# Patient Record
Sex: FEMALE | Race: WHITE | Employment: OTHER | URBAN - METROPOLITAN AREA
[De-identification: names, ages, dates, MRNs, and addresses within clinical notes are randomized per-mention and may not be internally consistent; named-entity substitution may affect disease eponyms.]

---

## 2021-07-25 ENCOUNTER — HOSPITAL ENCOUNTER (EMERGENCY)
Age: 86
Discharge: HOME | End: 2021-07-25
Attending: EMERGENCY MEDICINE
Payer: MEDICARE

## 2021-07-25 ENCOUNTER — APPOINTMENT (OUTPATIENT)
Dept: GENERAL RADIOLOGY | Age: 86
End: 2021-07-25
Payer: MEDICARE

## 2021-07-25 VITALS
RESPIRATION RATE: 21 BRPM | OXYGEN SATURATION: 96 % | DIASTOLIC BLOOD PRESSURE: 64 MMHG | SYSTOLIC BLOOD PRESSURE: 125 MMHG | TEMPERATURE: 98 F | HEART RATE: 62 BPM | HEIGHT: 62 IN

## 2021-07-25 DIAGNOSIS — R55 SYNCOPE, UNSPECIFIED SYNCOPE TYPE: Primary | ICD-10-CM

## 2021-07-25 LAB
A/G RATIO: 1.4 (ref 1.1–2.2)
ALBUMIN SERPL-MCNC: 3.3 G/DL (ref 3.4–5)
ALP BLD-CCNC: 60 U/L (ref 40–129)
ALT SERPL-CCNC: 22 U/L (ref 10–40)
ANION GAP SERPL CALCULATED.3IONS-SCNC: 10 MMOL/L (ref 3–16)
AST SERPL-CCNC: 23 U/L (ref 15–37)
BASOPHILS ABSOLUTE: 0 K/UL (ref 0–0.2)
BASOPHILS RELATIVE PERCENT: 0.2 %
BILIRUB SERPL-MCNC: 0.9 MG/DL (ref 0–1)
BUN BLDV-MCNC: 18 MG/DL (ref 7–20)
CALCIUM SERPL-MCNC: 8.2 MG/DL (ref 8.3–10.6)
CHLORIDE BLD-SCNC: 107 MMOL/L (ref 99–110)
CO2: 24 MMOL/L (ref 21–32)
CREAT SERPL-MCNC: 1 MG/DL (ref 0.6–1.2)
EOSINOPHILS ABSOLUTE: 0.1 K/UL (ref 0–0.6)
EOSINOPHILS RELATIVE PERCENT: 1.8 %
GFR AFRICAN AMERICAN: >60
GFR NON-AFRICAN AMERICAN: 53
GLOBULIN: 2.3 G/DL
GLUCOSE BLD-MCNC: 153 MG/DL (ref 70–99)
HCT VFR BLD CALC: 38.6 % (ref 36–48)
HEMOGLOBIN: 12.9 G/DL (ref 12–16)
LYMPHOCYTES ABSOLUTE: 0.7 K/UL (ref 1–5.1)
LYMPHOCYTES RELATIVE PERCENT: 11.1 %
MAGNESIUM: 2 MG/DL (ref 1.8–2.4)
MCH RBC QN AUTO: 30.8 PG (ref 26–34)
MCHC RBC AUTO-ENTMCNC: 33.4 G/DL (ref 31–36)
MCV RBC AUTO: 92.1 FL (ref 80–100)
MONOCYTES ABSOLUTE: 0.3 K/UL (ref 0–1.3)
MONOCYTES RELATIVE PERCENT: 4 %
NEUTROPHILS ABSOLUTE: 5.3 K/UL (ref 1.7–7.7)
NEUTROPHILS RELATIVE PERCENT: 82.9 %
PDW BLD-RTO: 14.9 % (ref 12.4–15.4)
PLATELET # BLD: 145 K/UL (ref 135–450)
PMV BLD AUTO: 8.1 FL (ref 5–10.5)
POTASSIUM REFLEX MAGNESIUM: 3.5 MMOL/L (ref 3.5–5.1)
RBC # BLD: 4.19 M/UL (ref 4–5.2)
SODIUM BLD-SCNC: 141 MMOL/L (ref 136–145)
TOTAL PROTEIN: 5.6 G/DL (ref 6.4–8.2)
TROPONIN: <0.01 NG/ML
WBC # BLD: 6.4 K/UL (ref 4–11)

## 2021-07-25 PROCEDURE — 71046 X-RAY EXAM CHEST 2 VIEWS: CPT

## 2021-07-25 PROCEDURE — 36415 COLL VENOUS BLD VENIPUNCTURE: CPT

## 2021-07-25 PROCEDURE — 80053 COMPREHEN METABOLIC PANEL: CPT

## 2021-07-25 PROCEDURE — 85025 COMPLETE CBC W/AUTO DIFF WBC: CPT

## 2021-07-25 PROCEDURE — 99283 EMERGENCY DEPT VISIT LOW MDM: CPT

## 2021-07-25 PROCEDURE — 84484 ASSAY OF TROPONIN QUANT: CPT

## 2021-07-25 PROCEDURE — 83735 ASSAY OF MAGNESIUM: CPT

## 2021-07-25 PROCEDURE — 93005 ELECTROCARDIOGRAM TRACING: CPT | Performed by: EMERGENCY MEDICINE

## 2021-07-25 ASSESSMENT — PAIN SCALES - GENERAL: PAINLEVEL_OUTOF10: 0

## 2021-07-25 NOTE — ED NOTES
Pt states that she was at the house with her daughter and son in law. Pt states she is not sure what happened but was sitting down when she passed out, pt did not fall out of the chair, nor did she hit her head. Pt is currently denying chest pain shortness of breath, dizziness, or any additional pain.       Nallely Horn, JER  07/25/21 1038       Helen Gutierrez RN  07/25/21 1208

## 2021-07-25 NOTE — ED NOTES
Pt was able to ambulate around ER safely with RN independently. PT was given PO fluids and tolerated those well. ED MD was notified.        Lu Casanova RN  07/25/21 8014

## 2021-07-25 NOTE — ED NOTES
Bed: B-06  Expected date: 7/25/21  Expected time: 9:47 AM  Means of arrival: [facility]  Comments:  86F syncope, stung by 8 bees on yesterday     Aba Esposito RN  07/25/21 9569

## 2021-07-25 NOTE — ED PROVIDER NOTES
CHIEF COMPLAINT  Loss of Consciousness (states she was sitting in her 's wheelchair talking to her daughter and her daughter states she passed out. patient does not remember, patient was stung by several bees yesterday)      HISTORY OF PRESENT ILLNESS  Victor Hugo Greenfield is a 82 y.o. female who with past medical history of A. fib and hypertension presents to the ED with EMS complaining of syncopal episode. Patient states that she was sitting in her 's wheelchair talking to her daughter and her daughter told her that she passed out for approximately 2 to 3 minutes. States that the patient slumped back in the chair but did not actually fall. States that the patient did become arousable when EMS arrived and has been alert since EMS arrival.  No treatment given by EMS prior to arrival.  Patient denies any recent illness. No nausea or vomiting. No fevers or chills. No chest pain or shortness of breath. Denies any numbness or weakness. No headache or vision changes. States has been tolerating oral intake without difficulty over the past week. Denies any history of syncope in the past.  States she was stung by a few bees yesterday on the left hand. Denies any shortness of breath or chest tightness since being stung. States there was local reaction in the left hand but this has resolved. Did take a dose of Benadryl yesterday but no Benadryl today. No other complaints, modifying factors or associated symptoms. Nursing notes reviewed.    Past medical history of A. fib and hypertension  Denies any past surgical history  Has any pertinent family history  Social History     Socioeconomic History    Marital status:      Spouse name: Not on file    Number of children: Not on file    Years of education: Not on file    Highest education level: Not on file   Occupational History    Not on file   Tobacco Use    Smoking status: Not on file   Substance and Sexual Activity    Alcohol use: Not on file    Drug use: Not on file    Sexual activity: Not on file   Other Topics Concern    Not on file   Social History Narrative    Not on file     Social Determinants of Health     Financial Resource Strain:     Difficulty of Paying Living Expenses:    Food Insecurity:     Worried About Running Out of Food in the Last Year:     [address] in the Last Year:    Transportation Needs:     Lack of Transportation (Medical):  Lack of Transportation (Non-Medical):    Physical Activity:     Days of Exercise per Week:     Minutes of Exercise per Session:    Stress:     Feeling of Stress :    Social Connections:     Frequency of Communication with Friends and Family:     Frequency of Social Gatherings with Friends and Family:     Attends [employer] Services:     Active Member of Clubs or Organizations:     Attends Club or Organization Meetings:     Marital Status:    Intimate Partner Violence:     Fear of Current or Ex-Partner:     Emotionally Abused:     Physically Abused:     Sexually Abused:      No current facility-administered medications for this encounter. No current outpatient medications on file.      Allergies   Allergen Reactions    Aspirin     Lisinopril      cough         REVIEW OF SYSTEMS  10 systems reviewed, pertinent positives per HPI otherwise noted to be negative    PHYSICAL EXAM  /64   Pulse 62   Temp 98 °F (36.7 °C)   Resp 21   Ht 5' 2\" (1.575 m)   SpO2 96%      CONSTITUTIONAL: AOx4, cooperative with exam, afebrile   HEAD: normocephalic, atraumatic   EYES: PERRL, EOMI, anicteric sclera   ENT: Moist mucous membranes, uvula midline   NECK: Supple, symmetric, trachea midline   LUNGS: Bilateral breath sounds, CTAB, no rales/ronchi/wheezes   CARDIOVASCULAR: RRR, normal S1/S2, no m/r/g, 2+ pulses throughout   ABDOMEN: Soft, non-tender, non-distended, +BS   NEUROLOGIC:  MAEx4, 5/5 strength throughout; fine touch sensation intact throughout; normal gait; finger-to-nose testing normal; GCS 15, cranial nerves II through XII intact, no dysarthria, no aphasia, no facial droop   MUSCULOSKELETAL: No clubbing, cyanosis or edema   SKIN: No rash, pallor or wounds on exposed surfaces         RADIOLOGY  X-RAYS:  I have reviewed radiologic plain film image(s). ALL OTHER NON-PLAIN FILM IMAGES SUCH AS CT, ULTRASOUND AND MRI HAVE BEEN READ BY THE RADIOLOGIST. XR CHEST (2 VW)   Final Result   No acute cardiopulmonary disease. Cardiomegaly without overt failure. EKG INTERPRETATION  Normal sinus rhythm with a rate of 67, normal axis, , QRS 60, QTc 452, no ST elevations, nonspecific ST changes, no prior EKG on file. PROCEDURES    ED COURSE/MDM  Orthostatic syncope, vasovagal syncope, medication reaction, dehydration, electrolyte abnormality, arrhythmia, other  Patient seen and evaluated. History and physical as above. Nontoxic, afebrile. Patient presents after syncopal episode that was witnessed by her daughter at home. Patient arrives alert and oriented x4. No focal deficits. No complaints on arrival.  Will obtain routine cardiac labs, troponin, EKG and chest x-ray. IV fluid bolus. ED Course as of Jul 25 1533   Sun Jul 25, 2021   1133 Ambulated with a steady gait. Tolerating oral intake. No complaints on reassessment. I feel patient is appropriate for discharge with outpatient follow-up at this time. Patient agreeable. Patient's daughter at bedside also agreeable care plan. Return instructions provided. All questions answered prior to discharge. [DS]      ED Course User Index  [DS] Sera Little MD       I estimate there is LOW risk for ACUTE CORONARY SYNDROME, INTRACRANIAL HEMORRHAGE, MALIGNANT DYSRHYTHMIA, MENINGITIS, PNEUMONIA, PULMONARY EMBOLISM, SEPSIS, SUBARACHNOID HEMORRHAGE, SUBDURAL HEMATOMA, STROKE, or URINARY TRACT INFECTION, thus I consider the discharge disposition reasonable.  Jaqueline Hampton and I have discussed the diagnosis and risks, and we agree with discharging home to follow-up with their primary doctor. We also discussed returning to the Emergency Department immediately if new or worsening symptoms occur. We have discussed the symptoms which are most concerning (e.g., changing or worsening pain, weakness, vomiting, fever) that necessitate immediate return. Patient was given scripts for the following medications. I counseled patient how to take these medications. There are no discharge medications for this patient. CLINICAL IMPRESSION  1. Syncope, unspecified syncope type        Blood pressure 125/64, pulse 62, temperature 98 °F (36.7 °C), resp. rate 21, height 5' 2\" (1.575 m), SpO2 96 %. DISPOSITION  Patient was discharged to home in good condition. Ady Johansen MD  [address] [place] [id number] [phone]    Call today  For a follow up appointment. Disclaimer: All medical record entries made by [employer] dictation.       (Please note that this note was completed with a voice recognition program. Every attempt was made to edit the dictations, but inevitably there remain words that are mis-transcribed.)            Michele Gipson MD  07/25/21 0443

## 2021-07-26 LAB
EKG ATRIAL RATE: 67 BPM
EKG DIAGNOSIS: NORMAL
EKG P AXIS: 65 DEGREES
EKG P-R INTERVAL: 166 MS
EKG Q-T INTERVAL: 428 MS
EKG QRS DURATION: 60 MS
EKG QTC CALCULATION (BAZETT): 452 MS
EKG R AXIS: 20 DEGREES
EKG T AXIS: 20 DEGREES
EKG VENTRICULAR RATE: 67 BPM

## 2021-07-26 PROCEDURE — 93010 ELECTROCARDIOGRAM REPORT: CPT | Performed by: INTERNAL MEDICINE

## 2022-03-16 ENCOUNTER — HOSPITAL ENCOUNTER (INPATIENT)
Age: 87
LOS: 7 days | Discharge: HOME | DRG: 871 | End: 2022-03-24
Attending: EMERGENCY MEDICINE | Admitting: STUDENT IN AN ORGANIZED HEALTH CARE EDUCATION/TRAINING PROGRAM
Payer: MEDICARE

## 2022-03-16 ENCOUNTER — APPOINTMENT (OUTPATIENT)
Dept: CT IMAGING | Age: 87
DRG: 871 | End: 2022-03-16
Payer: MEDICARE

## 2022-03-16 DIAGNOSIS — R29.90 STROKE-LIKE SYMPTOMS: Primary | ICD-10-CM

## 2022-03-16 LAB
AMMONIA: 23 UMOL/L (ref 11–51)
ANION GAP SERPL CALCULATED.3IONS-SCNC: 16 MMOL/L (ref 3–16)
BASOPHILS ABSOLUTE: 0 K/UL (ref 0–0.2)
BASOPHILS RELATIVE PERCENT: 0.3 %
BUN BLDV-MCNC: 16 MG/DL (ref 7–20)
CALCIUM SERPL-MCNC: 9.1 MG/DL (ref 8.3–10.6)
CHLORIDE BLD-SCNC: 99 MMOL/L (ref 99–110)
CO2: 20 MMOL/L (ref 21–32)
CREAT SERPL-MCNC: 0.8 MG/DL (ref 0.6–1.2)
EOSINOPHILS ABSOLUTE: 0 K/UL (ref 0–0.6)
EOSINOPHILS RELATIVE PERCENT: 0.2 %
GFR AFRICAN AMERICAN: >60
GFR NON-AFRICAN AMERICAN: >60
GLUCOSE BLD-MCNC: 125 MG/DL (ref 70–99)
HCT VFR BLD CALC: 40.6 % (ref 36–48)
HEMOGLOBIN: 13.5 G/DL (ref 12–16)
LYMPHOCYTES ABSOLUTE: 0.7 K/UL (ref 1–5.1)
LYMPHOCYTES RELATIVE PERCENT: 10.3 %
MCH RBC QN AUTO: 29.9 PG (ref 26–34)
MCHC RBC AUTO-ENTMCNC: 33.3 G/DL (ref 31–36)
MCV RBC AUTO: 89.8 FL (ref 80–100)
MONOCYTES ABSOLUTE: 0.7 K/UL (ref 0–1.3)
MONOCYTES RELATIVE PERCENT: 9.7 %
NEUTROPHILS ABSOLUTE: 5.6 K/UL (ref 1.7–7.7)
NEUTROPHILS RELATIVE PERCENT: 79.5 %
PDW BLD-RTO: 15.6 % (ref 12.4–15.4)
PLATELET # BLD: 151 K/UL (ref 135–450)
PMV BLD AUTO: 7.4 FL (ref 5–10.5)
POTASSIUM REFLEX MAGNESIUM: 3.8 MMOL/L (ref 3.5–5.1)
RBC # BLD: 4.52 M/UL (ref 4–5.2)
SODIUM BLD-SCNC: 135 MMOL/L (ref 136–145)
TSH REFLEX: 0.39 UIU/ML (ref 0.27–4.2)
WBC # BLD: 7 K/UL (ref 4–11)

## 2022-03-16 PROCEDURE — 99285 EMERGENCY DEPT VISIT HI MDM: CPT

## 2022-03-16 PROCEDURE — 36415 COLL VENOUS BLD VENIPUNCTURE: CPT

## 2022-03-16 PROCEDURE — 85025 COMPLETE CBC W/AUTO DIFF WBC: CPT

## 2022-03-16 PROCEDURE — 70450 CT HEAD/BRAIN W/O DYE: CPT

## 2022-03-16 PROCEDURE — 84443 ASSAY THYROID STIM HORMONE: CPT

## 2022-03-16 PROCEDURE — 82140 ASSAY OF AMMONIA: CPT

## 2022-03-16 PROCEDURE — 80048 BASIC METABOLIC PNL TOTAL CA: CPT

## 2022-03-16 ASSESSMENT — PAIN DESCRIPTION - DESCRIPTORS: DESCRIPTORS: ACHING

## 2022-03-16 ASSESSMENT — PAIN DESCRIPTION - LOCATION
LOCATION: HEAD
LOCATION: HEAD

## 2022-03-16 ASSESSMENT — PAIN SCALES - GENERAL
PAINLEVEL_OUTOF10: 5
PAINLEVEL_OUTOF10: 3

## 2022-03-16 ASSESSMENT — PAIN DESCRIPTION - PAIN TYPE
TYPE: ACUTE PAIN
TYPE: ACUTE PAIN

## 2022-03-16 ASSESSMENT — PAIN DESCRIPTION - ONSET: ONSET: GRADUAL

## 2022-03-16 ASSESSMENT — PAIN DESCRIPTION - PROGRESSION
CLINICAL_PROGRESSION: GRADUALLY IMPROVING
CLINICAL_PROGRESSION: NOT CHANGED

## 2022-03-17 ENCOUNTER — APPOINTMENT (OUTPATIENT)
Dept: MRI IMAGING | Age: 87
DRG: 871 | End: 2022-03-17
Payer: MEDICARE

## 2022-03-17 PROBLEM — I63.9 ACUTE CEREBROVASCULAR ACCIDENT (CVA) (HCC): Status: ACTIVE | Noted: 2022-03-17

## 2022-03-17 PROBLEM — R47.01 EXPRESSIVE APHASIA: Status: ACTIVE | Noted: 2022-03-17

## 2022-03-17 LAB
ANION GAP SERPL CALCULATED.3IONS-SCNC: 17 MMOL/L (ref 3–16)
BUN BLDV-MCNC: 14 MG/DL (ref 7–20)
CALCIUM SERPL-MCNC: 8.7 MG/DL (ref 8.3–10.6)
CHLORIDE BLD-SCNC: 99 MMOL/L (ref 99–110)
CHOLESTEROL, TOTAL: 159 MG/DL (ref 0–199)
CO2: 18 MMOL/L (ref 21–32)
CREAT SERPL-MCNC: 0.6 MG/DL (ref 0.6–1.2)
ESTIMATED AVERAGE GLUCOSE: 122.6 MG/DL
GFR AFRICAN AMERICAN: >60
GFR NON-AFRICAN AMERICAN: >60
GLUCOSE BLD-MCNC: 102 MG/DL (ref 70–99)
HBA1C MFR BLD: 5.9 %
HCT VFR BLD CALC: 40.4 % (ref 36–48)
HDLC SERPL-MCNC: 68 MG/DL (ref 40–60)
HEMOGLOBIN: 13.3 G/DL (ref 12–16)
LDL CHOLESTEROL CALCULATED: 77 MG/DL
MCH RBC QN AUTO: 29.9 PG (ref 26–34)
MCHC RBC AUTO-ENTMCNC: 33 G/DL (ref 31–36)
MCV RBC AUTO: 90.4 FL (ref 80–100)
PDW BLD-RTO: 15.5 % (ref 12.4–15.4)
PLATELET # BLD: 141 K/UL (ref 135–450)
PMV BLD AUTO: 7.5 FL (ref 5–10.5)
POTASSIUM REFLEX MAGNESIUM: 3.6 MMOL/L (ref 3.5–5.1)
RBC # BLD: 4.47 M/UL (ref 4–5.2)
SODIUM BLD-SCNC: 134 MMOL/L (ref 136–145)
TRIGL SERPL-MCNC: 71 MG/DL (ref 0–150)
VLDLC SERPL CALC-MCNC: 14 MG/DL
WBC # BLD: 7.7 K/UL (ref 4–11)

## 2022-03-17 PROCEDURE — 97535 SELF CARE MNGMENT TRAINING: CPT

## 2022-03-17 PROCEDURE — 2580000003 HC RX 258: Performed by: INTERNAL MEDICINE

## 2022-03-17 PROCEDURE — 92523 SPEECH SOUND LANG COMPREHEN: CPT

## 2022-03-17 PROCEDURE — 1200000000 HC SEMI PRIVATE

## 2022-03-17 PROCEDURE — 83036 HEMOGLOBIN GLYCOSYLATED A1C: CPT

## 2022-03-17 PROCEDURE — 97530 THERAPEUTIC ACTIVITIES: CPT

## 2022-03-17 PROCEDURE — 99222 1ST HOSP IP/OBS MODERATE 55: CPT | Performed by: INTERNAL MEDICINE

## 2022-03-17 PROCEDURE — G0378 HOSPITAL OBSERVATION PER HR: HCPCS

## 2022-03-17 PROCEDURE — 70551 MRI BRAIN STEM W/O DYE: CPT

## 2022-03-17 PROCEDURE — 87040 BLOOD CULTURE FOR BACTERIA: CPT

## 2022-03-17 PROCEDURE — 97116 GAIT TRAINING THERAPY: CPT

## 2022-03-17 PROCEDURE — 36415 COLL VENOUS BLD VENIPUNCTURE: CPT

## 2022-03-17 PROCEDURE — 6370000000 HC RX 637 (ALT 250 FOR IP): Performed by: INTERNAL MEDICINE

## 2022-03-17 PROCEDURE — 80061 LIPID PANEL: CPT

## 2022-03-17 PROCEDURE — 85027 COMPLETE CBC AUTOMATED: CPT

## 2022-03-17 PROCEDURE — 80048 BASIC METABOLIC PNL TOTAL CA: CPT

## 2022-03-17 PROCEDURE — 97162 PT EVAL MOD COMPLEX 30 MIN: CPT

## 2022-03-17 PROCEDURE — 6360000002 HC RX W HCPCS: Performed by: INTERNAL MEDICINE

## 2022-03-17 PROCEDURE — 97166 OT EVAL MOD COMPLEX 45 MIN: CPT

## 2022-03-17 PROCEDURE — 6370000000 HC RX 637 (ALT 250 FOR IP): Performed by: NURSE PRACTITIONER

## 2022-03-17 PROCEDURE — 92610 EVALUATE SWALLOWING FUNCTION: CPT

## 2022-03-17 RX ORDER — FERROUS SULFATE 325(65) MG
325 TABLET ORAL
COMMUNITY

## 2022-03-17 RX ORDER — LABETALOL HYDROCHLORIDE 5 MG/ML
10 INJECTION, SOLUTION INTRAVENOUS EVERY 10 MIN PRN
Status: DISCONTINUED | OUTPATIENT
Start: 2022-03-17 | End: 2022-03-24 | Stop reason: HOSPADM

## 2022-03-17 RX ORDER — CETIRIZINE HYDROCHLORIDE 10 MG/1
10 TABLET ORAL DAILY PRN
COMMUNITY

## 2022-03-17 RX ORDER — BUDESONIDE 3 MG/1
6 CAPSULE, COATED PELLETS ORAL EVERY MORNING
COMMUNITY

## 2022-03-17 RX ORDER — LOSARTAN POTASSIUM 25 MG/1
25 TABLET ORAL DAILY
Status: DISCONTINUED | OUTPATIENT
Start: 2022-03-17 | End: 2022-03-20

## 2022-03-17 RX ORDER — CARVEDILOL 12.5 MG/1
12.5 TABLET ORAL 2 TIMES DAILY WITH MEALS
COMMUNITY

## 2022-03-17 RX ORDER — ONDANSETRON 4 MG/1
4 TABLET, ORALLY DISINTEGRATING ORAL EVERY 8 HOURS PRN
Status: DISCONTINUED | OUTPATIENT
Start: 2022-03-17 | End: 2022-03-24 | Stop reason: HOSPADM

## 2022-03-17 RX ORDER — ACETAMINOPHEN 500 MG
750 TABLET ORAL EVERY 6 HOURS PRN
COMMUNITY

## 2022-03-17 RX ORDER — ASPIRIN 300 MG/1
300 SUPPOSITORY RECTAL DAILY
Status: DISCONTINUED | OUTPATIENT
Start: 2022-03-17 | End: 2022-03-24 | Stop reason: HOSPADM

## 2022-03-17 RX ORDER — POLYETHYLENE GLYCOL 3350 17 G/17G
17 POWDER, FOR SOLUTION ORAL DAILY PRN
Status: DISCONTINUED | OUTPATIENT
Start: 2022-03-17 | End: 2022-03-24 | Stop reason: HOSPADM

## 2022-03-17 RX ORDER — ACETAMINOPHEN 325 MG/1
650 TABLET ORAL EVERY 4 HOURS PRN
Status: DISCONTINUED | OUTPATIENT
Start: 2022-03-17 | End: 2022-03-19

## 2022-03-17 RX ORDER — CALCIUM CARBONATE 200(500)MG
1 TABLET,CHEWABLE ORAL 3 TIMES DAILY PRN
COMMUNITY

## 2022-03-17 RX ORDER — M-VIT,TX,IRON,MINS/CALC/FOLIC 27MG-0.4MG
1 TABLET ORAL DAILY
COMMUNITY

## 2022-03-17 RX ORDER — ATORVASTATIN CALCIUM 80 MG/1
80 TABLET, FILM COATED ORAL NIGHTLY
Status: DISCONTINUED | OUTPATIENT
Start: 2022-03-17 | End: 2022-03-24 | Stop reason: HOSPADM

## 2022-03-17 RX ORDER — CARVEDILOL 12.5 MG/1
12.5 TABLET ORAL 2 TIMES DAILY WITH MEALS
Status: DISCONTINUED | OUTPATIENT
Start: 2022-03-17 | End: 2022-03-24 | Stop reason: HOSPADM

## 2022-03-17 RX ORDER — ATORVASTATIN CALCIUM 20 MG/1
20 TABLET, FILM COATED ORAL DAILY
COMMUNITY

## 2022-03-17 RX ORDER — LOSARTAN POTASSIUM 50 MG/1
50 TABLET ORAL DAILY
COMMUNITY

## 2022-03-17 RX ORDER — ONDANSETRON 2 MG/ML
4 INJECTION INTRAMUSCULAR; INTRAVENOUS EVERY 6 HOURS PRN
Status: DISCONTINUED | OUTPATIENT
Start: 2022-03-17 | End: 2022-03-24 | Stop reason: HOSPADM

## 2022-03-17 RX ORDER — ASPIRIN 81 MG/1
81 TABLET ORAL DAILY
Status: DISCONTINUED | OUTPATIENT
Start: 2022-03-17 | End: 2022-03-24 | Stop reason: HOSPADM

## 2022-03-17 RX ORDER — ALENDRONATE SODIUM 70 MG/1
70 TABLET ORAL
COMMUNITY

## 2022-03-17 RX ADMIN — ACETAMINOPHEN 650 MG: 325 TABLET ORAL at 12:30

## 2022-03-17 RX ADMIN — ASPIRIN 81 MG: 81 TABLET, COATED ORAL at 10:43

## 2022-03-17 RX ADMIN — ACETAMINOPHEN 650 MG: 325 TABLET ORAL at 21:15

## 2022-03-17 RX ADMIN — CEFTRIAXONE 2000 MG: 2 INJECTION, POWDER, FOR SOLUTION INTRAMUSCULAR; INTRAVENOUS at 15:59

## 2022-03-17 RX ADMIN — CARVEDILOL 12.5 MG: 12.5 TABLET, FILM COATED ORAL at 08:34

## 2022-03-17 RX ADMIN — CARVEDILOL 12.5 MG: 12.5 TABLET, FILM COATED ORAL at 17:26

## 2022-03-17 RX ADMIN — ATORVASTATIN CALCIUM 80 MG: 80 TABLET, FILM COATED ORAL at 21:01

## 2022-03-17 RX ADMIN — VANCOMYCIN HYDROCHLORIDE 1250 MG: 10 INJECTION, POWDER, LYOPHILIZED, FOR SOLUTION INTRAVENOUS at 17:26

## 2022-03-17 RX ADMIN — LOSARTAN POTASSIUM 25 MG: 25 TABLET, FILM COATED ORAL at 10:43

## 2022-03-17 ASSESSMENT — PAIN SCALES - GENERAL
PAINLEVEL_OUTOF10: 0

## 2022-03-17 NOTE — H&P
Hospital Medicine History & Physical      PCP: Sergio Randhawa MD    Date of Admission: 3/16/2022    Date of Service: Pt seen/examined on 3/17/2022 and Placed in Observation. Chief Complaint:  Expressive aphasia      History Of Present Illness:      82 y.o. female with PMHx of A-fib, TIA, HTN, HLD and recent stent removal from lacrimal duct presented to [address] [place] [address] with confusion and 3 separate episodes of  expressive aphasia in the last 2 days. Patient was seen at good [address] [place] [address] Road twice in that timeframe and discharged home both times after neg workup with CT head without contrast and CTA head and neck. Symptoms returned again tonight. She is now weak and almost fell tonight while trying to stand and walk. Family immediately called 911 instead of having her try and stand again. She denies unilateral weakness or paresthesias. She does feel like it is difficult to get the right words out but family has not noticed any slurring of her words. No facial droop. Past Medical History:          Diagnosis Date    Atrial fibrillation [facility])        Past Surgical History:      History reviewed. No pertinent surgical history. Medications Prior to Admission:      Prior to Admission medications    Not on File       Allergies:  Aspirin and Lisinopril    Social History:        TOBACCO:   has no history on file for tobacco use. ETOH:   has no history on file for alcohol use. Family History:      Reviewed in detail positive as follows:    History reviewed. No pertinent family history. REVIEW OF SYSTEMS:   Pertinent positives as noted in the HPI. All other systems reviewed and negative.     PHYSICAL EXAM PERFORMED:    BP (!) 163/145   Pulse 83   Temp 99.6 °F (37.6 °C) (Oral)   Resp 29   Ht 5' 2\" (1.575 m)   Wt 141 lb 1.5 oz (64 kg)   SpO2 100%   BMI 25.81 kg/m²     General appearance:  Well developed, well nourished, elderly  female lying on ED cart in no apparent distress, appears stated age and cooperative. HEENT:  Normal cephalic, atraumatic without obvious deformity. Pupils equal, round, and reactive to light. Conjunctivae/corneas clear. Neck: Supple, with full range of motion. No jugular venous distention. Trachea midline. Respiratory:  Normal respiratory effort. Clear to auscultation, bilaterally without accessory muscle use. Cardiovascular:  Regular rate and rhythm without murmurs, no lower extremity edema. Abdomen: Soft, non-tender, non-distended, without rebound or guarding. Normal bowel sounds. Musculoskeletal:  Moves all extremities equally. Full range of motion without deformity. Skin: Skin warm, dry and intact. No rashes or lesions. Neurologic:  Neurovascularly intact without any focal sensory/motor deficits. Cranial nerves: II-XII intact, grossly non-focal.  Did not ambulate, fall risk  Psychiatric:  Alert and oriented, thought content appropriate, normal insight  Capillary Refill: Brisk,< 3 seconds   Peripheral Pulses: +2 palpable, equal bilaterally       Labs:     Recent Labs     03/16/22  2239   WBC 7.0   HGB 13.5   HCT 40.6        Recent Labs     03/16/22  2239   *   K 3.8   CL 99   CO2 20*   BUN 16   CREATININE 0.8   CALCIUM 9.1     No results for input(s): AST, ALT, BILIDIR, BILITOT, ALKPHOS in the last 72 hours. No results for input(s): INR in the last 72 hours. No results for input(s): Barbara Kashia in the last 72 hours. Urinalysis:    No results found for: Cathalene Bookman, BACTERIA, RBCUA, BLOODU, [address], [address]    Radiology:     CT HEAD WO CONTRAST   Final Result   1. No acute intracranial abnormality. 2. Mild cerebral white matter chronic microvascular ischemic disease. 3. Soft tissue volume loss involving the left frontal scalp suggesting   association with scarring. Recommend clinical correlation.          MRI brain without contrast    (Results Pending)       [facility]   Outside Information  CT [facility] NECK W  recently, now having headaches   COMPARISON:  CTA neck 1/25/2021   NOTE:  If there are questions about the content of this report, please contact [address] [place] [address] radiology by calling [phone]   TECHNIQUE:  Post intravenous contrast axial CT angiogram images with multiplanar reconstructions from the aortic arch to the skull base including offline reconstructions on a separate workstation   [facility] NOTE:  Internal carotid artery stenoses are reported using the Yusuf American Symptomatic Carotid Endarterectomy Trial (NASCET) criteria whereby the stenosis is expressed as a percentage from the view showing the greatest stenosis comparing the   diameter of the residual lumen with a luminal diameter of the internal carotid artery well beyond the bulb where the walls are parallel     FINDINGS:   AORTIC ARCH:  Atherosclerotic calcifications of the aortic arch and proximal great arteries without significant stenosis           SUBCLAVIAN ARTERIES:  Unremarkable   RIGHT COMMON CAROTID ARTERY:  Unremarkable   RIGHT INTERNAL CAROTID ARTERY:  Unremarkable   RIGHT EXTERNAL CAROTID ARTERY:  Unremarkable   LEFT COMMON CAROTID ARTERY:  Unremarkable   LEFT INTERNAL CAROTID ARTERY:  Unremarkable   LEFT EXTERNAL CAROTID ARTERY:  Unremarkable   RIGHT VERTEBRAL ARTERY:  Unremarkable   LEFT VERTEBRAL ARTERY:  Unremarkable     NECK SOFT TISSUES:  Exophytic posterior left thyroid nodule probably reflects goiter. OTHER:  None     IMPRESSION       No hemodynamically significant stenosis, dissection or large vessel occlusion.   Specimen Collected: 03/15/22  7:56 PM Last Resulted: 03/15/22  7:59 PM   Received From: [facility]  Result Received: 03/16/22  9:41 PM   View Encounter       [facility]   Outside Information             CT ANGIO HEAD W AND OR WO CONTRAST    Anatomical Region Laterality Modality   Neck  Computed Tomography   Head         Impression  Performed by [employer]      No significant abnormality    Narrative  Performed by [facility]  HISTORY:  Transient ischemic attack (TIA)  had right eyeduct stent removed  recently, now having headaches   COMPARISON:  1/25/2021   NOTE:  If there are questions about the content of this report, please contact [address] [place] [address] radiology by calling [phone]   TECHNIQUE:  Post intravenous contrast axial CT angiogram images with multiplanar reconstructions of the head including offline reconstructions on a separate workstation . FINDINGS:   DISTAL RIGHT INTERNAL CAROTID ARTERY:  Unremarkable   DISTAL LEFT INTERNAL CAROTID ARTERY:  Unremarkable   RIGHT MIDDLE CEREBRAL ARTERY:  Unremarkable   LEFT MIDDLE CEREBRAL ARTERY:  Unremarkable   RIGHT ANTERIOR CEREBRAL ARTERY:  Unremarkable   LEFT ANTERIOR CEREBRAL ARTERY:  Unremarkable   ANTERIOR COMMUNICATING ARTERY:  Unremarkable     DISTAL RIGHT VERTEBRAL ARTERY:  Unremarkable   DISTAL LEFT VERTEBRAL ARTERY:  Unremarkable   BASILAR ARTERY:  Unremarkable   RIGHT POSTERIOR CEREBRAL ARTERY:  Unremarkable   LEFT POSTERIOR CEREBRAL ARTERY:  Unremarkable   RIGHT POSTERIOR COMMUNICATING ARTERY:  Unremarkable   LEFT POSTERIOR COMMUNICATING ARTERY:  Unremarkable     OTHER:  None    Procedure Note    Yash Avendano MD - 03/15/2022   Formatting of this note might be different from the original.   HISTORY:  Transient ischemic attack (TIA)  had right eyeduct stent removed  recently, now having headaches   COMPARISON:  1/25/2021   NOTE:  If there are questions about the content of this report, please contact [address] [place] [address] by calling [phone]   TECHNIQUE:  Post intravenous contrast axial CT angiogram images with multiplanar reconstructions of the head including offline reconstructions on a separate workstation .      FINDINGS:   DISTAL RIGHT INTERNAL CAROTID ARTERY:  Unremarkable   DISTAL LEFT INTERNAL CAROTID ARTERY:  Unremarkable   RIGHT MIDDLE CEREBRAL ARTERY:  Unremarkable   LEFT MIDDLE CEREBRAL ARTERY:  Unremarkable   RIGHT ANTERIOR CEREBRAL ARTERY:  Unremarkable   LEFT ANTERIOR CEREBRAL ARTERY:  Unremarkable   ANTERIOR COMMUNICATING ARTERY:  Unremarkable     DISTAL RIGHT VERTEBRAL ARTERY:  Unremarkable   DISTAL LEFT VERTEBRAL ARTERY:  Unremarkable   BASILAR ARTERY:  Unremarkable   RIGHT POSTERIOR CEREBRAL ARTERY:  Unremarkable   LEFT POSTERIOR CEREBRAL ARTERY:  Unremarkable   RIGHT POSTERIOR COMMUNICATING ARTERY:  Unremarkable   LEFT POSTERIOR COMMUNICATING ARTERY:  Unremarkable     OTHER:  None     IMPRESSION       No significant abnormality  Specimen Collected: 03/15/22  7:56 PM Last Resulted: 03/15/22  8:01 PM   Received From: [facility]  Result Received: 03/16/22  9:41 PM   View Encounter         ASSESSMENT:    Active Hospital Problems    Diagnosis Date Noted    Expressive aphasia [R47.01] 03/17/2022         PLAN:    Possible TIA/CVA   - with symptoms: Expressive Aphasia  - admit to OBS to RO TIA/CVA  - out of the tPA window  - head CT neg for acute pathology  - CTA head/neck: performed at [facility] : No large vessel occlusion or hemodynamically significant stenosis of the vessels within the head or neck  - MRI and ECHO  - ASA, statin and xarelto  - consult neurology   - check lipids, HBA1c  - allow permissive HTN, SBP < 220, DBP < 110    Atrial Fibrillation   - currently rate controlled  - continue xarelto and coreg 12.5 bid  - On Coumadin; monitor daily PT/INR. HTN  - monitor blood pressure  - continue cozaar 25 mg daily    HLD  - continue statin    DVT Prophylaxis: Xarelto  Diet: Diet NPO  Code Status: Full Code    PT/OT Eval Status: pending    Dispo - Observation       [address], APRN - CNP    Thank you Mariposa Keller MD for the opportunity to be involved in this patient's care. If you have any questions or concerns please feel free to contact me at [phone].

## 2022-03-17 NOTE — PROGRESS NOTES
NAME:  Sim Tobias  YOB: 1935  MEDICAL RECORD NUMBER:  [id number]  TODAYS DATE:  3/17/2022    Discussed personal risk factors for Stroke /TIA with patient/family, and ways to reduce the risk for a recurrent stroke. Patient's personal risk factors which were identified are:     [] Alcohol Abuse: check with your physician before any alcohol consumption. [x] Atrial fibrillation: may cause blood clots. [] Drug Abuse: Seek help, talk with your doctor  [] Clotting Disorder  [] Diabetes  [] Family history of stroke or heart disease  [] High Blood Pressure/Hypertension: work with your physician.  [] High cholesterol: monitor cholesterol levels with your physician.   [] Overweight/Obesity: work with your physician for your ideal body weight.  [] Physical Inactivity: get regular exercise as directed by your physician. [] Personal history of previous TIA or stroke  [] Poor Diet; decrease salt (sodium) in your diet, follow diet directed by physician. [] Smoking: Cigarette/Cigar: stop smoking. Advised pt. that you can reduce your risk for stroke/TIA by modifying/controlling the risk factors that you have. Pt.advised to take the medications as prescribed, which will be detailed in the discharge instructions, and to not stop taking them without consulting their physician. In addition, pt. advised to maintain a healthy diet, exercise regularly and to not smoke. [facility]'s Stroke treatment and prevention, Managing your recovery  notebook  provided and/or reviewed  with patient/family. The notebook includes, but not limited to, sections addressing warning signs & symptoms of a stroke, which are: sudden numbness or weakness especially on one side of the body, sudden confusion, difficulty speaking or understanding, sudden changes in vision, sudden dizziness or loss of balance/ coordination, or sudden severe headache.   The need to call EMS ([phone]) immediately if signs & symptoms occur is emphasized . The notebook also provides education on Stroke community resources and stroke advocacy. The need for follow-up after discharge was highlighted with patient/family with them being able to repeat understanding of the importance of this.       Electronically signed by Sabrina Self RN on 3/17/2022 at 6:57 AM

## 2022-03-17 NOTE — PROGRESS NOTES
Occupational Therapy   Occupational Therapy Initial Assessment and Tentative D/C    Date: 3/17/2022   Patient Name: Margaret Cardenas  MRN: [id number]     : 1935    Date of Service: 3/17/2022    Discharge Recommendations: Margaret Cardenas scored a 15/24 on the AM-PAC ADL Inpatient form. Current research shows that an AM-PAC score of 17 or less is typically not associated with a discharge to the patient's home setting. Based on the patient's AM-PAC score and their current ADL deficits, it is recommended that the patient have 5-7 sessions per week of Occupational Therapy at d/c to increase the patient's independence. At this time, this patient demonstrates the endurance, and/or tolerance for 3 hours of therapy each day, with a treatment frequency of 5-7x/wk. Please see assessment section for further patient specific details. If patient discharges prior to next session this note will serve as a discharge summary. Please see below for the latest assessment towards goals. Continue to assess pending progress,5-7 sessions per week,Patient would benefit from continued therapy after discharge  OT Equipment Recommendations  Equipment Needed: No    Assessment   Performance deficits / Impairments: Decreased functional mobility ; Decreased strength;Decreased endurance;Decreased ADL status; Decreased safe awareness;Decreased cognition;Decreased balance;Decreased high-level IADLs  Assessment: 82 y.o. female with PMHx of A-fib, TIA, HTN, HLD and recent stent removal from lacrimal duct presented to [facility] with confusion and 3 separate episodes of  expressive aphasia in the last 2 days. Patient was seen at [address] twice in that timeframe and discharged home both times after neg workup with CT head without contrast and CTA head and neck. Symptoms returned again tonight. She is now weak and almost fell tonight while trying to stand and walk.   Family immediately called 911 instead of having her try and stand again. She denies unilateral weakness or paresthesias. She does feel like it is difficult to get the right words out but family has not noticed any slurring of her words. No facial droop. PTA pt from home with  where pt was Ind with mobility and ADLs. Pt currently requires CGA/Min A and use of RW for mobility and transfers. Pt needing assist to manage RW. No overt LOB. Anticipate pt needing up to Mod A for ADLs. Pt with not aphasia needing increased cues for sequencing tasks. Pt will benefit from skilled OT services at this time. Anticipate pt with need for ongoing OT at time of D/C. Prognosis: Good  Decision Making: Medium Complexity  Exam: see above  Assistance / Modification: RW  OT Education: Plan of Care;OT Role;Transfer Training;ADL Adaptive Strategies  REQUIRES OT FOLLOW UP: Yes  Activity Tolerance  Activity Tolerance: Patient Tolerated treatment well  Safety Devices  Safety Devices in place: Yes  Type of devices: Left in chair;Chair alarm in place;Call light within reach;Gait belt;Nurse notified           Patient Diagnosis(es): The encounter diagnosis was Stroke-like symptoms. has a past medical history of Atrial fibrillation ([facility] [address] has no past surgical history on file. Restrictions  Restrictions/Precautions  Restrictions/Precautions: Fall Risk    Subjective   General  Chart Reviewed: Yes  Patient assessed for rehabilitation services?: Yes  Additional Pertinent Hx: per CNP note, 82 y.o. female with PMHx of A-fib, TIA, HTN, HLD and recent stent removal from lacrimal duct presented to [facility] with confusion and 3 separate episodes of  expressive aphasia in the last 2 days. Patient was seen at [address] twice in that timeframe and discharged home both times after neg workup with CT head without contrast and CTA head and neck. Symptoms returned again tonight. She is now weak and almost fell tonight while trying to stand and walk.   Family immediately called 911 instead of having her try and stand again. She denies unilateral weakness or paresthesias. She does feel like it is difficult to get the right words out but family has not noticed any slurring of her words. No facial droop. Family / Caregiver Present: Yes (daughter)  Referring Practitioner: Agata Officer, APRN - CNP  Subjective  Subjective: Pt agreeable to OT evaluation. Pt with no reports of pain. Social/Functional History  Social/Functional History  Lives With: Spouse ( Merced Marshall) : w/c \"most of the time. \")  Type of Home:  (Condo.)  Home Layout: Multi-level,Able to Live on Main level with bedroom/bathroom (2 story with basement. Laundry lower level.)  Home Access: Ramped entrance (3 steps with handrail : garage into main level of home.)  Bathroom Shower/Tub: Walk-in shower  Bathroom Toilet: Handicap height  Bathroom Equipment: Grab bars in shower,Shower chair,Grab bars around toilet  Bathroom Accessibility: Accessible  Home Equipment: Rolling walker,Wheelchair-manual (DME used by . 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 Additional Comments: 2 falls in last month; information verified with daughter       Objective   Vision: Impaired  Vision Exceptions: Wears glasses at all times  Hearing: Within functional limits    Orientation  Overall Orientation Status:  (difficulty assessing due to aphasia)     Balance  Sitting Balance: Supervision  Standing Balance: Contact guard assistance (RW)  Functional Mobility  Functional - Mobility Device: Rolling Walker  Activity: To/from bathroom; Other (~25ft x2)  Assist Level: Minimal assistance  Functional Mobility Comments: assist to manage RW; no overt LOB; increased time to complete  Toilet Transfers  Toilet - Technique: Ambulating (RW)  Equipment Used: Grab bars  Toilet Transfer: Contact guard assistance  Wheelchair Bed Transfers  Wheelchair/Bed - Technique: Ambulating (RW)  Equipment Used: Other (chair)  Level of Asssistance: Contact guard assistance  ADL  Grooming: Setup (seated to wash hands)  Toileting: Moderate assistance (assist to manage underwear; CGA for balance in stance; pt completes pericare seated)  Additional Comments: Anticipate pt needing up to Mod A for ADLs based on ROM, strength, and balance  Tone RUE  RUE Tone: Normotonic  Tone LUE  LUE Tone: Normotonic  Quality of Movement Other  Comment: appear WFL although some difficulty following commands     Bed mobility  Supine to Sit: Unable to assess  Sit to Supine: Unable to assess  Scooting: Unable to assess  Transfers  Sit to stand: Contact guard assistance  Stand to sit: Contact guard assistance  Transfer Comments: to/from RW; cues for hand placement     Cognition  Overall Cognitive Status: Exceptions  Arousal/Alertness: Appropriate responses to stimuli  Following Commands: Follows one step commands with increased time; Follows one step commands with repetition  Memory: Decreased recall of recent events  Safety Judgement: Decreased awareness of need for safety  Insights: Decreased awareness of deficits        Sensation  Overall Sensation Status: WFL (Pt denies any numb/tingling.)        LUE AROM (degrees)  LUE AROM : WFL  RUE AROM (degrees)  RUE AROM : WFL  LUE Strength  Gross LUE Strength: WFL  L Hand General: 4/5  RUE Strength  Gross RUE Strength: WFL  R Hand General: 4/5                   Plan   Plan  Times per week: 3-5x  Current Treatment Recommendations: Strengthening,Functional Mobility Training,Gait Training,Endurance Training,Balance Training,Safety Education & Training,Self-Care / ADL,Patient/Caregiver Education & Training      AM-PAC Score        AM-PAC Inpatient Daily Activity Raw Score: 15 (03/17/22 1042)  AM-PAC Inpatient ADL T-Scale Score : 34.69 (03/17/22 1042)  ADL Inpatient CMS 0-100% Score: 56.46 (03/17/22 1042)  ADL Inpatient CMS G-Code Modifier : CK (03/17/22 1042)    Goals  Short term goals  Time Frame for Short term goals: prior to D/C  Short term goal 1: complete functional mobility and transfers Mod Ind  Short term goal 2: complete bathing and dressing Mod Ind  Short term goal 3: complete toileting Mod Ind  Short term goal 4: complete grooming in stance at sink Mod Ind  Long term goals  Time Frame for Long term goals : STG=LTG  Patient Goals   Patient goals : return to PLOF       Therapy Time   Individual Concurrent Group Co-treatment   Time In 0950         Time Out 1035         Minutes 45         Timed Code Treatment Minutes: 30 Minutes (15 minute eval)       Faraz Johnson, OTR/L

## 2022-03-17 NOTE — ED NOTES
Pt going to Bed 0913, Report called to Eboni RANDLE who verblized No further questions.       Ryan Jorgensen RN  03/17/22 9026

## 2022-03-17 NOTE — PROGRESS NOTES
Speech Language/Pathology   SPEECH LANGUAGE AND CLINICAL BEDSIDE SWALLOWING EVALUATION   Speech Therapy Department     Melita Menon  AGE: 82 y.o. GENDER: female  : 1935  [id number]  EPISODE DATE:  3/16/2022    MEDICAL DIAGNOSIS: CVA r/o  Chief Complaint   Patient presents with    Fall     Pt from home with . In to ED via [facility] per their report \"Pt fell getting up out of chair. \" Pt's daughter called EMS and stated her mom \" seems confused since 6pm last night. \" EMS further reports that the Pt was seen at [address] last night, \" CT was done and they didnt see anything. \" Pt presents tonight to ED A&o x4 at this time on room air. pain 5/10 Headache, pt denies hitting head. Onset: 3/16/2022    PAST MEDICAL HISTORY      Diagnosis Date    Atrial fibrillation ([facility] [address]      PAST SURGICAL HISTORY  History reviewed. No pertinent surgical history. ALLERGIES  Allergies   Allergen Reactions    Aspirin     Lisinopril      cough     General  Chart Reviewed: Yes  Patient assessed for rehabilitation services?: Yes  Family / Caregiver Present: Yes (daughter)  Pain Assessment  Patient Currently in Pain: No     CHART REVIEW:   3/16/2022 pt admitted with c/o expressive aphasia  MD ADMISSION HPI H&P: 82 y.o. female with PMHx of A-fib, TIA, HTN, HLD and recent stent removal from lacrimal duct presented to [facility] with confusion and 3 separate episodes of  expressive aphasia in the last 2 days. Patient was seen at [address] twice in that timeframe and discharged home both times after neg workup with CT head without contrast and CTA head and neck. Symptoms returned again tonight. She is now weak and almost fell tonight while trying to stand and walk. Family immediately called 911 instead of having her try and stand again. She denies unilateral weakness or paresthesias.   She does feel like it is difficult to get the right words out but family has not noticed any slurring of her words. No facial droop. MRI: scheduled    HEAD CT: 3/16/2022     Impression   1. No acute intracranial abnormality. 2. Mild cerebral white matter chronic microvascular ischemic disease. 3. Soft tissue volume loss involving the left frontal scalp suggesting   association with scarring.  Recommend clinical correlation.         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 Dysphagia Treatment Diagnosis: Oropharyngeal Dysphagia   Speech Language Treatment Diagnosis: cognitive language    IMPRESSIONS  Dysphagia Diagnosis: Mild oral stage dysphagia characterized by prolonged but adequate with no concern for excess labor or fatigue with regular solids, suspect premature bolus loss to the pharynx with all; Mild pharyngeal stage dysphagia characterized by delayed swallow and decreased laryngeal elevation  Cognitive Diagnosis: WILLARD d/t severity of aphasia at this time  Aphasia Diagnosis: moderate- severe receptive aphasia characterized by difficulty following directions and responding to questions and severe difficulty with reading comprehension at word level. moderate- severe expressive aphasia characterized by decreased automatic responsive and confrontation naming requiring max cues for <25% accuracy at times.  unable to demonstrate written expression for name or numerals/ letters. Speech Diagnosis: no apparent slurring or reduced speech intelligibility concern for potential for verbal apraxia with need for continued monitor/ assessment     Recommended Diet:  Diet Solids Recommendation: Regular  Liquid Consistency Recommendation: Thin   Meds: PO    Strategies:   Compensatory Swallowing Strategies: Remain upright for 30-45 minutes after meals,Upright as possible for all oral intake    Plan:     Therapy  Requires SLP Intervention: Yes  Therapeutic Interventions: Patient/Family education,Diet tolerance monitoring  Duration/Frequency of Treatment: ST to tx 3-5x per week during acute admission    Dysphagia Goals  Dysphagia Goals: The patient will tolerate recommended diet without observed clinical signs of aspiration,The patient/caregiver will demonstrate understanding of compensatory strategies for improved swallowing safety.      Speech and Language Goals  Goals:   Short-term Goals  Goal 1: pt will improve auditory comprehension at sentence level to min cues  Goal 2: pt will improve reading comprehension at word level to moderate cues  Goal 3: pt will improve word finding at word level to moderate cues for automatic, confrontation, and/or responsive naming tasks  Goal 4: pt will copy biographical information independently  Goal 5: pt will tolerate ongoing assessment with additional goals to be determined as appropriate    Barriers to Progress: Communication deficit    Prognosis  Prognosis for safe diet advancement: good     Education  Consulted and agree with results and recommendations: Patient,RN,Family member  Patient Education: completed on recs/plans/results  Patient Education Response: Demonstrated understanding,Needs reinforcement    Discharge Recommendations:   D/C Recommendations:  (rec skilled ST upon discharge)  Requires SLP Intervention: Yes    Objective:  ASSESSMENT: Included Clinical Evaluation of Swallowing; Oral Motor Speech Evaluation and Cognitive-Linguistic Assessment    Vision: Impaired  Hearing: Within functional limits  Oral/Motor  Lingual Strength: Reduced  Auditory Comprehension  Yes/No Questions: Moderate  Basic Questions: Moderate  Complex Questions: Moderate  One Step Basic Commands: Moderate  Two Step Basic Commands: Moderate  Multistep Basic Commands: Moderate  L/R Discrimination:  (requires extra time)  Conversation: Moderate for simple  Reading Comprehension  Words Impairment Severity: Severe  Phrases Impairment Severity: To be assessed in therapy  Sentence Impairment Severity: To be assessed in therapy  Paragraph Impairment Severity: To be assessed in therapy  Expression  Primary Mode of Expression: Verbal  Verbal Expression  Repetition: To be assessed in therapy  Automatic Speech: Mild  Confrontation: Severe (carrier phrase and phonemic cueing appears beneficial at times)  Convergent: To be assessed in therapy  Divergent: To be assessed in therapy  Responsive:  Moderate (carrier phrase and phonemic cueing appears beneficial at times)  Conversation: Moderate  Effective Techniques: Provide extra time (carrier phrases and phonemic cueing appears beneficial at times)  Written Expression  Dominant Hand: Right  Legibility Impairment Severity: Severe  Dictation Impairment Severity:  (severe for letters/ numerals)  Motor Speech  Motor Speech:  (no apparent slurring or reduced speech intelligbility, verbal expression is limited at times d/t severity of aphasia, rec continued monitor for potential apraxia/ motor speech imps)  Pragmatics/Social Functioning  Pragmatics: Within functional limits     Cognitive- Language  Orientation  Orientation Level: Oriented to place;Oriented to person  Attention  Attention: Unable to assess  Memory  Memory: Unable to assess  Problem Solving  Problem Solving: Unable to assess  Numeric Reasoning  Numeric Reasoning: Unable to assess  Abstract Reasoning  Abstract Reasoning: Unable to assess  Safety/Judgement  Safety/Judgement: Unable to assess    Oral Phase Dysfunction  Impaired Mastication:  (prolonged but adequate with no concern for excess labor or fatigue with regular solids)  Suspected Premature Bolus Loss: All     Indicators of Pharyngeal Phase Dysfunction  Delayed Swallow: All  Decreased Laryngeal Elevation: All    Please accept this as Speech Therapy Discharge status, if pt is discharged prior to next therapy session. Timed Code Treatment:  SLP Individual Minutes  Time In: 1110  Time Out: 1551  Minutes: 60  Total Treatment Time:  45 minute SLP  15 minute swallow    SIGNED:   CHARLIE Hanna,  Clinician  03/17/22 12:10 PM    This Speech Language Pathologist was present directed the patient's care, made skilled judgement and was responsible for assessment and treatment. Lorenzo JenkinsCarolinas ContinueC[facility], [id number]  Speech-Language Pathologist  Portable phone: [phone]  03/17/22 2:06 PM

## 2022-03-17 NOTE — CONSULTS
Infectious Diseases Inpatient Consult Note      Reason for Consult:  Fevers, LEFT side scalp/head aches     Requesting Physician:       Primary Care Physician:  Carline Schlatter, MD    History Obtained From:  [facility] , family and Patient   CHIEF COMPLAINT:     Chief Complaint   Patient presents with    Fall     Pt from home with . In to ED via [facility] per their report \"Pt fell getting up out of chair. \" Pt's daughter called EMS and stated her mom \" seems confused since 6pm last night. \" EMS further reports that the Pt was seen at [address] last night, \" CT was done and they didnt see anything. \" Pt presents tonight to ED A&o x4 at this time on room air. pain 5/10 Headache, pt denies hitting head. HISTORY OF PRESENT ILLNESS:  82 y.o. woman with a past medical history of hypertension, chronic anticoagulation, atrial fibrillation admitted to the hospital secondary to left-sided headache, scalp pain, fever, chills, some confusion. Patient was generally feeling very weak and tired and having left-sided headache was seen in [facility] ED, completed CT of the brain as well as CTA of the neck was negative she was given symptomatic treatment. Following this at home daughter noticed that her mother was more confused and weak hence brought her to the hospital for further evaluation. She recently also had a right lacrimal duct stent removal though Nasal endoscocpy on 3/9/21 , per family she was on oral amoxicillin therapy and completed the course recently . IN the hospital now developed fever T-max 102.4, ongoing left-sided scalp pain but no skin lesions. Location : Left side head aches and pain       Quality : aching        Severity : 8/10    Duration : 7 days       Timing : intermittent  Context : None    Modifying factors : none   Associated signs and symptoms: fevers, no cough no sputum no skin lesions no GI or  symptoms.          Past Medical History:    Past Medical History:   Diagnosis Date    Atrial fibrillation [facility])        Past Surgical History:    History reviewed. No pertinent surgical history.     Current Medications:    Outpatient Medications Marked as Taking for the 3/16/22 encounter [facility] Encounter)   Medication Sig Dispense Refill    alendronate (FOSAMAX) 70 MG tablet Take 70 mg by mouth every 7 days      atorvastatin (LIPITOR) 20 MG tablet Take 20 mg by mouth daily      budesonide (ENTOCORT EC) 3 MG extended release capsule Take 6 mg by mouth every morning      carvedilol (COREG) 12.5 MG tablet Take 12.5 mg by mouth 2 times daily (with meals)      losartan (COZAAR) 50 MG tablet Take 50 mg by mouth daily       rivaroxaban (XARELTO) 15 MG TABS tablet Take 15 mg by mouth daily (with breakfast)      vitamin D 25 MCG (1000 UT) CAPS Take 1,000 Units by mouth in the morning, at noon, and at bedtime      ferrous sulfate (IRON 325) 325 (65 Fe) MG tablet Take 325 mg by mouth daily (with breakfast)      Cyanocobalamin 5000 MCG TBDP Take 5,000 mcg by mouth every 30 days      cetirizine (ZYRTEC) 10 MG tablet Take 10 mg by mouth daily as needed for Allergies      acetaminophen (TYLENOL) 500 MG tablet Take 750 mg by mouth every 6 hours as needed for Pain      calcium carbonate (TUMS) 500 MG chewable tablet Take 1 tablet by mouth 3 times daily as needed for Heartburn      Multiple Vitamins-Minerals (THERAPEUTIC MULTIVITAMIN-MINERALS) tablet Take 1 tablet by mouth daily         Allergies:  Aspirin and Lisinopril    Immunizations :   Immunization History   Administered Date(s) Administered    COVID-19, Moderna, Booster, PF, 50mcg/0.25ml 11/04/2021    COVID-19, Moderna, Primary or Immunocompromised, PF, 100mcg/0.5mL 01/24/2021, 02/21/2021         Social History:   Social History     Tobacco Use    Smoking status: Not on file    Smokeless tobacco: Not on file   Substance Use Topics    Alcohol use: Not on file    Drug use: Not on file     Social History Tobacco Use   Smoking Status Not on file   Smokeless Tobacco Not on file      faMILY hISTORY : NO DVT no COPD       REVIEW OF SYSTEMS:     Constitutional:  fevers, +  Chills + , night sweats  Eyes:  negative for blurred vision, eye discharge, visual disturbance   HEENT:  negative for hearing loss, ear drainage,nasal congestion  Respiratory:  negative for cough, shortness of breath or hemoptysis   Cardiovascular:  negative for chest pain, palpitations, syncope  Gastrointestinal:  negative for nausea, vomiting, diarrhea, constipation, abdominal pain  Genitourinary:  negative for frequency, dysuria, urinary incontinence, hematuria  Hematologic/Lymphatic:  negative for easy bruising, bleeding and lymphadenopathy  Allergic/Immunologic:  negative for recurrent infections, angioedema, anaphylaxis   Endocrine:  negative for weight changes, polyuria, polydipsia and polyphagia  Musculoskeletal:  negative for joint  pain, swelling, decreased range of motion  Integumentary: No rashes, skin lesions  Neurological:  Left side head aches+ [ain +  unilateral weakness  Psychiatric: negative for hallucinations,confusion,agitation.      PHYSICAL EXAM:      Vitals:  T max 102.4   BP (!) 152/93   Pulse 81   Temp 98.9 °F (37.2 °C) (Oral)   Resp 14   Ht 5' 2\" (1.575 m)   Wt 141 lb 1.5 oz (64 kg)   SpO2 96%   BMI 25.81 kg/m²     General Appearance: alert,in no acute distress, no pallor, no icterus   Skin: warm and dry, no rash or erythema  Head: normocephalic and atraumatic  Eyes: pupils equal, round, and reactive to light, conjunctivae normal  ENT: tympanic membrane, external ear and ear canal normal bilaterally, nose without deformity, nasal mucosa and turbinates normal without polyps  Neck: supple and non-tender without mass, no thyromegaly  no cervical lymphadenopathy  Pulmonary/Chest: clear to auscultation bilaterally- no wheezes, rales or rhonchi, normal air movement, no respiratory distress  Cardiovascular: normal rate, regular rhythm, normal S1 and S2, no murmurs, rubs, clicks, or gallops, no carotid bruits  Abdomen: soft, non-tender, non-distended, normal bowel sounds, no masses or organomegaly  Extremities: no cyanosis, clubbing or edema  Musculoskeletal: normal range of motion, no joint swelling, deformity or tenderness  Integumentary: No rashes, no abnormal skin lesions, no petechiae  Neurologic: reflexes normal and symmetric, no cranial nerve deficit  Psych:  Orientation, sensorium, mood normal   Lines: IV    DATA:    CBC:   Lab Results   Component Value Date    WBC 7.7 03/17/2022    HGB 13.3 03/17/2022    HCT 40.4 03/17/2022    MCV 90.4 03/17/2022     03/17/2022     RENAL:   Lab Results   Component Value Date    CREATININE 0.6 03/17/2022    BUN 14 03/17/2022     (L) 03/17/2022    K 3.6 03/17/2022    CL 99 03/17/2022    CO2 18 (L) 03/17/2022     SED RATE: No results found for: SEDRATE  CK: No results found for: CKTOTAL  CRP: No results found for: CRP  Hepatic Function Panel:   Lab Results   Component Value Date    ALKPHOS 60 07/25/2021    ALT 22 07/25/2021    AST 23 07/25/2021    PROT 5.6 07/25/2021    PROT 6.4 12/21/2010    BILITOT 0.9 07/25/2021    LABALBU 3.3 07/25/2021     UA:No results found for: Adelbert Bryce, GLUCOSEU, Son Diane, SPECGRAV, BLOODU, PHUR, PROTEINU, UROBILINOGEN, NITRU, LEUKOCYTESUR, LABMICR, URINETYPE   Urine Microscopic: No results found for: LABCAST, BACTERIA, COMU, HYALCAST, WBCUA, RBCUA, EPIU  Urine Reflex to Culture: No results found for: URRFLXCULT      MICRO: cultures reviewed and updated by me          Micro results (current encounter only)    Procedure Component Value Units Date/Time   Culture, Blood 1 [[id number]] Collected: 03/17/22 1428   Order Status: Sent Specimen: Blood Updated: 03/17/22 1439   Culture, Blood 2 [[id number]] Collected: 03/17/22 1428   Order Status: Sent Specimen: Blood Updated: 03/17/22 1439         Blood Culture: No results found for: BC, BLOODCULT2    Viral Culture:    No results found for: COVID19  Urine Culture: No results for input(s): Harris Honer in the last 72 hours. Scheduled Meds:   atorvastatin  80 mg Oral Nightly    aspirin  81 mg Oral Daily    Or    aspirin  300 mg Rectal Daily    carvedilol  12.5 mg Oral BID WC    losartan  25 mg Oral Daily    cefTRIAXone (ROCEPHIN) IV  2,000 mg IntraVENous Q12H    vancomycin (VANCOCIN) intermittent dosing (placeholder)   Other RX Placeholder    vancomycin  1,250 mg IntraVENous Once       Continuous Infusions:      PRN Meds:  ondansetron **OR** ondansetron, polyethylene glycol, perflutren lipid microspheres, labetalol, acetaminophen    Imaging:   MRI brain without contrast   Final Result   1. No acute intracranial abnormality. No acute infarct. 2. Minimal global parenchymal volume loss with mild chronic microvascular   ischemic changes. 3. Fluid is seen in the right sphenoid sinus. CT HEAD WO CONTRAST   Final Result   1. No acute intracranial abnormality. 2. Mild cerebral white matter chronic microvascular ischemic disease. 3. Soft tissue volume loss involving the left frontal scalp suggesting   association with scarring. Recommend clinical correlation. Anatomical Region Laterality Modality   Neck  Computed Tomography       Impression  Performed by [email or web address]      No hemodynamically significant stenosis, dissection or large vessel occlusion.     Narrative  Performed by [email or web address]  HISTORY:  Transient ischemic attack (TIA)  had right eyeduct stent removed  recently, now having headaches   COMPARISON:  CTA neck 1/25/2021   NOTE:  If there are questions about the content of this report, please contact [facility] radiology by calling [phone]   TECHNIQUE:  Post intravenous contrast axial CT angiogram images with multiplanar reconstructions from the aortic arch to the skull base including offline reconstructions on a separate workstation       All pertinent images and

## 2022-03-17 NOTE — PROGRESS NOTES
History and physical reviewed from overnight. Agree with orders placed. 72-year-old female presented to the hospital with multiple episodes of confusion/aphasia. Of note patient had intervention performed lacrimal duct of the eye with a stent removal recently and has been on antibiotics which was just stopped on Tuesday couple of days ago. Patient has been febrile while in the hospital.  Will start IV antibiotics and consult ID, blood cultures will be ordered. MRI of the brain is pending to rule out acute CVA.     Jules Núñez MD  Hospitalist

## 2022-03-17 NOTE — PROGRESS NOTES
4 Eyes Skin Assessment     The patient is being assess for  Admission    I agree that 2 RN's have performed a thorough Head to Toe Skin Assessment on the patient. ALL assessment sites listed below have been assessed. Areas assessed by both nurses:   [x]   Head, Face, and Ears   [x]   Shoulders, Back, and Chest  [x]   Arms, Elbows, and Hands   [x]   Coccyx, Sacrum, and IschIum  [x]   Legs, Feet, and Heels        Does the Patient have Skin Breakdown?   No         Jag Prevention initiated:  NA   Wound Care Orders initiated:  NA      [facility] nurse consulted for Pressure Injury (Stage 3,4, Unstageable, DTI, NWPT, and Complex wounds), New and Established Ostomies:  NA      Nurse 1 eSignature: Electronically signed by Yoli Ladd RN on 3/17/22 at 4:31 AM EDT    **SHARE this note so that the co-signing nurse is able to place an eSignature**    Nurse 2 eSignature: Electronically signed by Apurva Pisano RN on 3/17/22 at 4:44 AM EDT

## 2022-03-17 NOTE — CONSULTS
Neurology Consult Note  Reason for Consult: expressive aphasia    Chief complaint: difficult to obtain from the patient    Dr Fadi Tariq, DO asked me to see Aram Rojas in consultation for evaluation of expressive aphasia    History of Present Illness:  Aram Rojas is a 82 y.o. female who presents with altered mental status. I obtained my information via interview w/ the patient and her daughter at the bedside, supplemented by chart review. Four days ago the patient developed a terrible L occipital headache. She ended up going to an OSH on Tuesday and was discharged. Later in the day she seemed confused and generally weak. She ended up going back to the ED Wednesday night for the above symptoms again and was again discharged. CT/CTA head/neck from those ED visits reportedly were unremarkable. Yesterday the patient seemed off all day. She was very weak and slow. She was shaky. At one point she sort of fell getting up from the chair. She started having some trouble understanding things and communicating. She came to this ED last evening/early this AM.      CT head here was w/out any acute findings. -170. This morning she is febrile and continues to have language trouble. Usually she is very sharp. She does have a hx of atrial fibrillation on Xarelto. Daughter does not the passing of the patient's brother earlier in the week w/ services this Friday/Saturday. Medical History:  Past Medical History:   Diagnosis Date    Atrial fibrillation [facility])      History reviewed. No pertinent surgical history.      Scheduled Meds:   atorvastatin  80 mg Oral Nightly    aspirin  81 mg Oral Daily    Or    aspirin  300 mg Rectal Daily    carvedilol  12.5 mg Oral BID WC    losartan  25 mg Oral Daily     Medications Prior to Admission:   alendronate (FOSAMAX) 70 MG tablet, Take 70 mg by mouth every 7 days  atorvastatin (LIPITOR) 20 MG tablet, Take 20 mg by mouth daily  budesonide (ENTOCORT EC) 3 MG extended release capsule, Take 6 mg by mouth every morning  carvedilol (COREG) 12.5 MG tablet, Take 12.5 mg by mouth 2 times daily (with meals)  losartan (COZAAR) 50 MG tablet, Take 25 mg by mouth in the morning and at bedtime  rivaroxaban (XARELTO) 15 MG TABS tablet, Take 15 mg by mouth daily (with breakfast)    Allergies   Allergen Reactions    Aspirin     Lisinopril      cough     History reviewed. No pertinent family history. Social History     Tobacco Use   Smoking Status Not on file   Smokeless Tobacco Not on file     Social History     Substance and Sexual Activity   Drug Use Not on file     Social History     Substance and Sexual Activity   Alcohol Use None     ROS  Constitutional- No weight loss or fevers  Eyes- No diplopia. No photophobia. Ears/nose/throat- No dysphagia. No Dysarthria  Cardiovascular- No palpitations. No chest pain  Respiratory- No dyspnea. No Cough  Gastrointestinal- No Abdominal pain. No Vomiting. Genitourinary- No incontinence. No urinary retention  Musculoskeletal- No myalgia. No arthralgia  Skin- No rash. No easy bruising. Psychiatric- No depression. No anxiety  Endocrine- No diabetes. No thyroid issues. Hematologic- No bleeding difficulty. No fatigue  Neurologic- No weakness.+ Headache. Exam  Blood pressure (!) 152/60, pulse 82, temperature 100.2 °F (37.9 °C), temperature source Axillary, resp. rate 28, height 5' 2\" (1.575 m), weight 141 lb 1.5 oz (64 kg), SpO2 95 %. Constitutional    Vital signs: BP, HR, and RR reviewed   General alert, no distress, well-nourished. Some tremulousness. Eyes: unable to visualize the fundi  Cardiovascular: no peripheral edema. Psychiatric: cooperative with examination, no psychotic behavior noted. Neurologic  Mental status:   orientation to person, place, month year.      General fund of knowledge grossly intact   Memory grossly intact   Attention intact as able to attend well to the exam Language she does struggle w/ expressive language more so than receptive. Comprehension intact; follows simple commands. Has more trouble w/ complex commands, ie for finger nose finger she kept touching her own fingers together. Cranial nerves:   CN2: visual fields full   CN 3,4,6: extraocular muscles intact  CN5: facial sensation symmetric   CN7: face symmetric without dysarthria  CN8: hearing grossly intact  CN9: palate elevated symmetrically  CN11: trap full strength on shoulder shrug  CN12: tongue midline with protrusion  Strength: good strength in all 4 extremities   Sensory: light touch intact in all 4 extremities. Cerebellar/coordination: finger nose finger difficult to assess due to patient having trouble following complex commands. Tone: normal in all 4 extremities  Gait: deferred at this time for safety. Labs  Glucose 102  Na 134  K 3.6  BUN 14  Cr 0.6    Ammonia 23  TSH 0.39    WBC 7.7K  Hg 13.3  Platelets 899    WFC8U 5.9  LDL 77    Studies  CT head w/o 3/16/22, independently reviewed  1. No acute intracranial abnormality. 2. Mild cerebral white matter chronic microvascular ischemic disease. 3. Soft tissue volume loss involving the left frontal scalp suggesting   association with scarring.  Recommend clinical correlation. CTA head/neck 3/15/22 - OSH  Unremarkable. Impression  1. The patient had been complaining of a L occipital headache w/ subsequent development of what family reports as confusion, general weakness, and language impairment. Her language trouble is still evident on exam.  Despite negative CT imaging there is still concern for acute stroke involving L MCA territory. She is now febrile so could consider metabolic/infectious cause (?viral CNS infection). Of note, her brother passed earlier in the week and services are scheduled for this weekend. 2.  Atrial fibrillation. Reports compliance w/ Xarelto. 3.  Tremulousness.     4.  Recent lacrimal stent placement/removal.  Just finished a course of antibiotics. Recommendations  1. MRI brain w/o.   2.  If stroke is confirmed would need TTE. 3.  OK to hold anticoagulation for now. 4.  Would recommend hospitalist obtain infectious workup. 5.  Speech therapy. 6.  Reasonable to keep SBP around 160 for now. 7.  Additional recommendations pending imaging.       Alejandra Marquez NP  [address] [place] [address] [address] Neurology    A copy of this note was provided for Dr Terrell Silva,

## 2022-03-17 NOTE — CONSULTS
Consult Note  Physical Medicine and Rehabilitation    Patient: Maegan Bustos  [id number]  Date: 3/17/2022      Chief Complaint: difficulty with speech    History of Present Illness/Hospital Course:  Patient is an 81 yo F with pmh Afib, HTN, HLD, TIA, and recent lacrimal ducts stent removal who initially presented 3/16/2022 with aphasia and weakness. Per report she had been having episodes of aphasia over the preceding 2 days. Was seen at [address] [place] [address] twice but was discharged home after negative CTH and CTA head/neck. On this admission, CT head showed no acute abnormality, mild cerebral white matter chronic microvascular ischemic disease. MRI brain showed no acute abnormality. Patient also noted to have fever, etiology unclear at this point. Infectious work-up and empiric antibiotics have been started. Currently, patient reports her speech and strength have improved over the course of the day. Daughter at the bedside agrees. Patient reports having some trouble with word findings and word substitutions, but she is now understanding those around her and able to express herself much easier. She denies headache, vision changes, tingling/numbness, or focal weakness. Prior Level of Function:  Independent for mobility, ADLs    Current Level of Function:  CGA-Nga for mobility  Up to modA for ADLs    Pertinent Social History:  Support: Lives with   Home set-up: 2 level Condo with [facility], 3 steps to enter    Past Medical History:   Diagnosis Date    Atrial fibrillation ([address]        History reviewed. No pertinent surgical history. History reviewed. No pertinent family history.     Social History     Socioeconomic History    Marital status:      Spouse name: None    Number of children: None    Years of education: None    Highest education level: None   Occupational History    None   Tobacco Use    Smoking status: None    Smokeless tobacco: None   Substance and Sexual Activity    Alcohol use: None    Drug use: None    Sexual activity: None   Other Topics Concern    None   Social History Narrative    None     Social Determinants of Health     Financial Resource Strain:     Difficulty of Paying Living Expenses: Not on file   Food Insecurity:     Worried About Running Out of Food in the Last Year: Not on file    Alfredo of Food in the Last Year: Not on file   Transportation Needs:     Lack of Transportation (Medical): Not on file    Lack of Transportation (Non-Medical):  Not on file   Physical Activity:     Days of Exercise per Week: Not on file    Minutes of Exercise per Session: Not on file   Stress:     Feeling of Stress : Not on file   Social Connections:     Frequency of Communication with Friends and Family: Not on file    Frequency of Social Gatherings with Friends and Family: Not on file    Attends Muslim Services: Not on file    Active Member of [address] [place] [address] or Organizations: Not on file    Attends Club or Organization Meetings: Not on file    Marital Status: Not on file   Intimate Partner Violence:     Fear of Current or Ex-Partner: Not on file    Emotionally Abused: Not on file    Physically Abused: Not on file    Sexually Abused: Not on file   Housing Stability:     Unable to Pay for Housing in the Last Year: Not on file    Number of [place] in the Last Year: Not on file    Unstable Housing in the Last Year: Not on file           REVIEW OF SYSTEMS:   CONSTITUTIONAL: negative for fevers, chills, diaphoresis, appetite change, night sweats, unexpected weight change, +fatigue  EYES: negative for blurred vision, eye discharge, visual disturbance and icterus  HEENT: negative for hearing loss, tinnitus, ear drainage, sinus pressure, nasal congestion, epistaxis and snoring  RESPIRATORY: Negative for hemoptysis, cough, sputum production  CARDIOVASCULAR: negative for chest pain, palpitations, exertional chest pressure/discomfort, syncope, edema  GASTROINTESTINAL: negative for nausea, vomiting, diarrhea, blood in stool, abdominal pain, constipation  GENITOURINARY: negative for frequency, dysuria, urinary incontinence, decreased urine volume, and hematuria  HEMATOLOGIC/LYMPHATIC: negative for easy bruising, bleeding and lymphadenopathy  ALLERGIC/IMMUNOLOGIC: negative for recurrent infections, angioedema, anaphylaxis and drug reactions  ENDOCRINE: negative for weight changes and diabetic symptoms including polyuria, polydipsia and polyphagia  MUSCULOSKELETAL: negative for pain, joint swelling, decreased range of motion  NEUROLOGICAL: refer to HPI  PSYCHIATRIC/BEHAVIORAL: negative for hallucinations, behavioral problems, confusion and agitation. Physical Examination:  Vitals:   Patient Vitals for the past 24 hrs:   BP Temp Temp src Pulse Resp SpO2 Height Weight   03/17/22 1220 (!) 152/93 102.4 °F (39.1 °C) Oral 81 14 96 %     03/17/22 1042 (!) 165/65   77 20      03/17/22 0823 (!) 152/60 100.2 °F (37.9 °C) Axillary 82 28 95 %     03/17/22 0821  100.2 °F (37.9 °C)         03/17/22 0520 (!) 187/71 100.1 °F (37.8 °C) Axillary 90 (!) 34      03/17/22 0242  99.6 °F (37.6 °C) Oral  29 100 % 5' 2\" (1.575 m) 141 lb 1.5 oz (64 kg)   03/17/22 0149    83       03/17/22 0145 (!) 163/145   86 27      03/17/22 0130 (!) 172/84   82 21      03/17/22 0100 (!) 141/125   79 20      03/16/22 2333    75    139 lb 8.8 oz (63.3 kg)   03/16/22 2315 (!) 170/123   77 21 100 %     03/16/22 2230 (!) 161/77   77 27 94 %     03/16/22 2146 (!) 155/75 98.7 °F (37.1 °C) Oral 74 18 94 %       Const: Alert. WDWN. No distress  Eyes: Conjunctiva noninjected, no icterus noted; pupils equal, round, and reactive to light. HENT: Atraumatic, normocephalic; Oral mucosa moist  Neck: Trachea midline, neck supple. No thyromegaly noted.   CV: Regular rate and rhythm, no murmur rub or gallop noted  Resp: Lungs clear to auscultation bilaterally, no rales wheezes or rhonchi, no retractions. Respirations unlabored. GI: Soft, nontender, nondistended. Normal bowel sounds. No palpable masses. Skin: Normal temperature and turgor. No rashes or breakdown noted. Ext: No significant edema appreciated. No varicosities. MSK: No joint tenderness, erythema, warmth noted. AROM intact. Neuro: Alert, oriented, appropriate. Speech overall fluent with mild intermittent anomia. Comprehension now seems intact. No cranial nerve deficits appreciated. Sensation intact to light touch. Motor examination reveals strength 5-/5 in BUR And BLE except 4/5 right hip flexion and 3/5 left hip flexion. No abnormalities with finger/nose noted. Reflexes diminished, symmetric. Psych: Stable mood, normal judgement, normal affect     Lab Results   Component Value Date    WBC 7.7 03/17/2022    HGB 13.3 03/17/2022    HCT 40.4 03/17/2022    MCV 90.4 03/17/2022     03/17/2022     No results found for: INR, PROTIME  Lab Results   Component Value Date    CREATININE 0.6 03/17/2022    BUN 14 03/17/2022     (L) 03/17/2022    K 3.6 03/17/2022    CL 99 03/17/2022    CO2 18 (L) 03/17/2022     Lab Results   Component Value Date    ALT 22 07/25/2021    AST 23 07/25/2021    ALKPHOS 60 07/25/2021    BILITOT 0.9 07/25/2021       Most recent echocardiogram revealed:  PENDING     Most recent EKG revealed:  Normal sinus rhythmConfirmed by ADÁN CHAUDHRY, Riley Norman ([phone]) on 7/26/2021 7:55:16 AM     Most recent imaging studies revealed:    CT head  1. No acute intracranial abnormality. 2. Mild cerebral white matter chronic microvascular ischemic disease. 3. Soft tissue volume loss involving the left frontal scalp suggesting   association with scarring.  Recommend clinical correlation. MRI brain  1. No acute intracranial abnormality.  No acute infarct. 2. Minimal global parenchymal volume loss with mild chronic microvascular   ischemic changes. 3. Fluid is seen in the right sphenoid sinus.        Assessment:  Mixed Expressive/Receptive Aphasia  ? Metabolic encephalopathy  Fever  Afib  HTN  HLD  H/o TIA    Recommendations:    Patient's weakness and aphasia now improving. No explanation for symptoms on brain imaging. ? Encephalopathy related to infectious process. Will follow as she undergoes further work-up and treatment and make recommendations for rehab pending functional progress. Thank you for this consult. Please contact me with any questions or concerns. Catina Quijano.  Lynda Ballard MD 3/17/2022, 12:59 PM

## 2022-03-17 NOTE — PROGRESS NOTES
Physical Therapy    Facility/Department: [address] PROGRESSIVE CARE  Initial Assessment    NAME: Marisela Velasquez  : 1935  MRN: [id number]    Date of Service: 3/17/2022    Discharge Recommendations:  Continue to assess pending progress,5-7 sessions per week   PT Equipment Recommendations  Other: Will monitor for potential equipt needs. Marisela Velasquez scored a 18/24 on the AM-PAC short mobility form. Current research shows that an AM-PAC score of 17 or less is typically not associated with a discharge to the patient's home setting. Based on the patient's AM-PAC score and their current functional mobility deficits, it is recommended that the patient have 5-7 sessions per week of Physical Therapy at d/c to increase the patient's independence. At this time, this patient demonstrates the endurance, and/or tolerance for 3 hours of therapy each day, with a treatment frequency of 5-7x/wk. Please see assessment section for further patient specific details. If patient discharges prior to next session this note will serve as a discharge summary. Please see below for the latest assessment towards goals. Assessment   Body structures, Functions, Activity limitations: Decreased functional mobility ; Decreased safe awareness;Decreased cognition;Decreased balance  Assessment: 81 y/o female admit 3/16/2022 with Confusion and 3 separate episodes of Aphasia. To ED ([facility]) x 2 with Confusion/Headache. CT head negative; d/c home. Since admit CT Head negative. MRI pending. PMH as noted including TIA, A-Fib, Recent Stent removed from Lacrimal Duct. PTA pt living with  in home with few steps to enter and 1st floor bed/bath; independent daily care and functional mobility (without assist device). Pt loc oob, amb short distances within hospital room setting; improved with use of Walker.    Strength appears functional/comparable although does appear to have some difficulty with processing/motor planning at times. At this time, would recommend cont PT (5-7) although will monitor pt's progress. Prognosis: Good  Decision Making: Medium Complexity  History: 81 y/o female admit 3/16/2022 with Confusion and 3 separate episodes of Aphasia. To ED ([facility]) x 2 with Confusion/Headache. CT head negative; d/c home. Since admit CT Head negative. MRI pending. PMH as noted including TIA, A-Fib, Recent Stent removed from Lacrimal Duct. Exam: See above. Clinical Presentation: See above. Patient Education: Role of PT, POC, Need to call for assist, Safe use of Walker. Barriers to Learning: Cognitive, Processing. REQUIRES PT FOLLOW UP: Yes  Activity Tolerance  Activity Tolerance: Patient Tolerated treatment well  Activity Tolerance: Pt loc oob, amb short distances within hospital room setting; improved with use of Walker. Strength appears functional/comparable although does appear to have some difficulty with processing/motor planning at times. BP : 174/72 (102); nursing aware. Patient Diagnosis(es): The encounter diagnosis was Stroke-like symptoms. has a past medical history of Atrial fibrillation ([address] has no past surgical history on file. Restrictions  Restrictions/Precautions  Restrictions/Precautions: Fall Risk  Vision/Hearing  Vision: Impaired  Vision Exceptions: Wears glasses at all times  Hearing: Within functional limits     Subjective  General  Chart Reviewed: Yes  Patient assessed for rehabilitation services?: Yes  Additional Pertinent Hx: 81 y/o female admit 3/16/2022 with Confusion and 3 separate episodes of Aphasia. To ED ([facility]) x 2 with Confusion/Headache. CT head negative; d/c home. Since admit CT Head negative. MRI pending. PMH as noted including TIA, A-Fib, Recent Stent removed from Lacrimal Duct. Family / Caregiver Present: No  Referring Practitioner: Chito Valladares NP. Diagnosis: R/O CVA/TIA.   Other (Comment): Pt follows simple commands; alittle delayed processing at times. Subjective  Subjective: Pt agreeable to PT Eval/Rx. Pain Screening  Patient Currently in Pain: Denies          Orientation  Orientation  Orientation Level: Oriented to person;Oriented to time (Pt alert to self/, year and \"hospital\" setting (initially unaware which one). Does appear alert to recent events although abit forgetful at times.)  Social/Functional History  Social/Functional History  Lives With: Spouse ( Cristina Collazo) : w/c \"most of the time. \")  Type of Home:  (Condo.)  Home Layout: Multi-level,Able to Live on Main level with bedroom/bathroom (2 story with basement. 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 Additional Comments: Pt does appear alittle conflicting with information at times. Pt initially reports  in w/c \"most of the time;\" then reports that he drives and sometimes uses a Walker. Cognition   Cognition  Overall Cognitive Status: Exceptions  Arousal/Alertness: Appropriate responses to stimuli  Following Commands:  Follows one step commands with increased time  Memory: Decreased recall of recent events  Safety Judgement: Decreased awareness of need for safety  Insights: Decreased awareness of deficits    Objective          AROM RLE (degrees)  RLE AROM: WFL  AROM LLE (degrees)  LLE AROM : WFL  AROM RUE (degrees)  RUE AROM : WFL  AROM LUE (degrees)  LUE AROM : WFL  Strength RLE  Strength RLE: WFL  Strength LLE  Strength LLE: WFL  Strength RUE  Strength RUE: WFL  Strength LUE  Strength LUE: WFL  Strength Other  Other: Strength comparable R/L; adequate for functional activitiets. Sensation  Overall Sensation Status: WFL (Pt denies any numb/tingling.)  Bed mobility  Supine to Sit: Minimal assistance (Bed slight elevated. Pt able to move LEs to eob/some difficulty moving to upright at eob.)  Transfers  Sit to Stand: Contact guard assistance (With Jaqueline Lee. Cues for safe hand placement.)  Stand to sit: Contact guard assistance (With Jaqueline Lee. Cues for safe hand placement.)  Comment: Toilet Transfer CGA. Pt able to complete pericare; some assist with managing undergarments (abit tight pulling up/down). Ambulation  Ambulation?: Yes  Ambulation 1  Surface: level tile  Distance: Pt initally amb bed to bathroom without assist device Handheld/Min assist.  Diminished step length/clearance; no specific LOB noted although very slow/guarded. Additional Amb from bathroom, within hospital room setting 40' with Walker Slight CGA. Improved step length/clearance; alittle assist with Walker negotiation. Cues for safe transitional mvts. Balance  Comments: EOB : SBA. Static Stand/Amb with Walker CGA. Plan   Plan  Times per week: 3-5x week while in acute care setting.   Current Treatment Recommendations: Functional Mobility Training,Transfer Training,Balance Training,Gait Training,Safety Education & Training,Patient/Caregiver Education & Training  Safety Devices  Type of devices: Call light within reach,Chair alarm in place,Left in chair,Nurse notified                         AM-PAC Score  AM-PAC Inpatient Mobility Raw Score : 18 (03/17/22 0939)  AM-PAC Inpatient T-Scale Score : 43.63 (03/17/22 0939)  Mobility Inpatient CMS 0-100% Score: 46.58 (03/17/22 9488)  Mobility Inpatient CMS

## 2022-03-18 LAB
ANION GAP SERPL CALCULATED.3IONS-SCNC: 16 MMOL/L (ref 3–16)
BASOPHILS ABSOLUTE: 0 K/UL (ref 0–0.2)
BASOPHILS RELATIVE PERCENT: 0.2 %
BILIRUBIN URINE: NEGATIVE
BLOOD, URINE: ABNORMAL
BUN BLDV-MCNC: 15 MG/DL (ref 7–20)
C-REACTIVE PROTEIN: <3 MG/L (ref 0–5.1)
CALCIUM SERPL-MCNC: 8.5 MG/DL (ref 8.3–10.6)
CHLORIDE BLD-SCNC: 101 MMOL/L (ref 99–110)
CLARITY: CLEAR
CO2: 20 MMOL/L (ref 21–32)
COLOR: YELLOW
CREAT SERPL-MCNC: 0.7 MG/DL (ref 0.6–1.2)
EOSINOPHILS ABSOLUTE: 0 K/UL (ref 0–0.6)
EOSINOPHILS RELATIVE PERCENT: 0.2 %
EPITHELIAL CELLS, UA: 1 /HPF (ref 0–5)
GFR AFRICAN AMERICAN: >60
GFR NON-AFRICAN AMERICAN: >60
GLUCOSE BLD-MCNC: 105 MG/DL (ref 70–99)
GLUCOSE URINE: NEGATIVE MG/DL
HCT VFR BLD CALC: 38.3 % (ref 36–48)
HEMOGLOBIN: 13 G/DL (ref 12–16)
HYALINE CASTS: 1 /LPF (ref 0–8)
KETONES, URINE: 15 MG/DL
LEUKOCYTE ESTERASE, URINE: NEGATIVE
LV EF: 58 %
LVEF MODALITY: NORMAL
LYMPHOCYTES ABSOLUTE: 1.3 K/UL (ref 1–5.1)
LYMPHOCYTES RELATIVE PERCENT: 18.4 %
MCH RBC QN AUTO: 30.2 PG (ref 26–34)
MCHC RBC AUTO-ENTMCNC: 34 G/DL (ref 31–36)
MCV RBC AUTO: 88.9 FL (ref 80–100)
MICROSCOPIC EXAMINATION: YES
MONOCYTES ABSOLUTE: 0.9 K/UL (ref 0–1.3)
MONOCYTES RELATIVE PERCENT: 13.6 %
NEUTROPHILS ABSOLUTE: 4.7 K/UL (ref 1.7–7.7)
NEUTROPHILS RELATIVE PERCENT: 67.6 %
NITRITE, URINE: NEGATIVE
PDW BLD-RTO: 15.3 % (ref 12.4–15.4)
PH UA: 6 (ref 5–8)
PLATELET # BLD: 140 K/UL (ref 135–450)
PMV BLD AUTO: 7.7 FL (ref 5–10.5)
POTASSIUM SERPL-SCNC: 3.7 MMOL/L (ref 3.5–5.1)
PROCALCITONIN: 13.38 NG/ML (ref 0–0.15)
PROTEIN UA: ABNORMAL MG/DL
RBC # BLD: 4.31 M/UL (ref 4–5.2)
RBC UA: 13 /HPF (ref 0–4)
REPORT: NORMAL
RESPIRATORY PANEL PCR: NORMAL
SEDIMENTATION RATE, ERYTHROCYTE: 25 MM/HR (ref 0–30)
SODIUM BLD-SCNC: 137 MMOL/L (ref 136–145)
SPECIFIC GRAVITY UA: 1.02 (ref 1–1.03)
URINE TYPE: ABNORMAL
UROBILINOGEN, URINE: 0.2 E.U./DL
VANCOMYCIN RANDOM: 6.8 UG/ML
WBC # BLD: 6.9 K/UL (ref 4–11)
WBC UA: 4 /HPF (ref 0–5)

## 2022-03-18 PROCEDURE — 6370000000 HC RX 637 (ALT 250 FOR IP): Performed by: INTERNAL MEDICINE

## 2022-03-18 PROCEDURE — 84145 PROCALCITONIN (PCT): CPT

## 2022-03-18 PROCEDURE — 99233 SBSQ HOSP IP/OBS HIGH 50: CPT | Performed by: INTERNAL MEDICINE

## 2022-03-18 PROCEDURE — 36415 COLL VENOUS BLD VENIPUNCTURE: CPT

## 2022-03-18 PROCEDURE — 86140 C-REACTIVE PROTEIN: CPT

## 2022-03-18 PROCEDURE — 6360000002 HC RX W HCPCS: Performed by: INTERNAL MEDICINE

## 2022-03-18 PROCEDURE — C8929 TTE W OR WO FOL WCON,DOPPLER: HCPCS

## 2022-03-18 PROCEDURE — 80048 BASIC METABOLIC PNL TOTAL CA: CPT

## 2022-03-18 PROCEDURE — 97530 THERAPEUTIC ACTIVITIES: CPT

## 2022-03-18 PROCEDURE — 85652 RBC SED RATE AUTOMATED: CPT

## 2022-03-18 PROCEDURE — 94760 N-INVAS EAR/PLS OXIMETRY 1: CPT

## 2022-03-18 PROCEDURE — 87086 URINE CULTURE/COLONY COUNT: CPT

## 2022-03-18 PROCEDURE — 6370000000 HC RX 637 (ALT 250 FOR IP): Performed by: NURSE PRACTITIONER

## 2022-03-18 PROCEDURE — 85025 COMPLETE CBC W/AUTO DIFF WBC: CPT

## 2022-03-18 PROCEDURE — 97535 SELF CARE MNGMENT TRAINING: CPT

## 2022-03-18 PROCEDURE — 81001 URINALYSIS AUTO W/SCOPE: CPT

## 2022-03-18 PROCEDURE — 1200000000 HC SEMI PRIVATE

## 2022-03-18 PROCEDURE — 2580000003 HC RX 258: Performed by: INTERNAL MEDICINE

## 2022-03-18 PROCEDURE — 80202 ASSAY OF VANCOMYCIN: CPT

## 2022-03-18 PROCEDURE — 97116 GAIT TRAINING THERAPY: CPT

## 2022-03-18 PROCEDURE — 0202U NFCT DS 22 TRGT SARS-COV-2: CPT

## 2022-03-18 RX ORDER — M-VIT,TX,IRON,MINS/CALC/FOLIC 27MG-0.4MG
1 TABLET ORAL DAILY
Status: DISCONTINUED | OUTPATIENT
Start: 2022-03-18 | End: 2022-03-24 | Stop reason: HOSPADM

## 2022-03-18 RX ORDER — BUDESONIDE 3 MG/1
3 CAPSULE, COATED PELLETS ORAL EVERY MORNING
Status: DISCONTINUED | OUTPATIENT
Start: 2022-03-18 | End: 2022-03-24 | Stop reason: HOSPADM

## 2022-03-18 RX ORDER — CETIRIZINE HYDROCHLORIDE 10 MG/1
10 TABLET ORAL DAILY PRN
Status: DISCONTINUED | OUTPATIENT
Start: 2022-03-18 | End: 2022-03-24 | Stop reason: HOSPADM

## 2022-03-18 RX ORDER — CALCIUM CARBONATE 200(500)MG
1 TABLET,CHEWABLE ORAL 3 TIMES DAILY PRN
Status: DISCONTINUED | OUTPATIENT
Start: 2022-03-18 | End: 2022-03-24 | Stop reason: HOSPADM

## 2022-03-18 RX ORDER — VITAMIN B COMPLEX
1000 TABLET ORAL 3 TIMES DAILY
Status: DISCONTINUED | OUTPATIENT
Start: 2022-03-18 | End: 2022-03-24 | Stop reason: HOSPADM

## 2022-03-18 RX ORDER — FERROUS SULFATE TAB EC 324 MG (65 MG FE EQUIVALENT) 324 (65 FE) MG
325 TABLET DELAYED RESPONSE ORAL
Status: DISCONTINUED | OUTPATIENT
Start: 2022-03-19 | End: 2022-03-24 | Stop reason: HOSPADM

## 2022-03-18 RX ADMIN — CARVEDILOL 12.5 MG: 12.5 TABLET, FILM COATED ORAL at 17:59

## 2022-03-18 RX ADMIN — ATORVASTATIN CALCIUM 80 MG: 80 TABLET, FILM COATED ORAL at 20:55

## 2022-03-18 RX ADMIN — Medication 1000 UNITS: at 15:33

## 2022-03-18 RX ADMIN — ACETAMINOPHEN 650 MG: 325 TABLET ORAL at 11:32

## 2022-03-18 RX ADMIN — Medication 1250 MG: at 12:46

## 2022-03-18 RX ADMIN — BUDESONIDE 3 MG: 3 CAPSULE, GELATIN COATED ORAL at 14:09

## 2022-03-18 RX ADMIN — CEFTRIAXONE 2000 MG: 2 INJECTION, POWDER, FOR SOLUTION INTRAMUSCULAR; INTRAVENOUS at 15:38

## 2022-03-18 RX ADMIN — CARVEDILOL 12.5 MG: 12.5 TABLET, FILM COATED ORAL at 09:37

## 2022-03-18 RX ADMIN — CEFTRIAXONE 2000 MG: 2 INJECTION, POWDER, FOR SOLUTION INTRAMUSCULAR; INTRAVENOUS at 03:46

## 2022-03-18 RX ADMIN — Medication 1 TABLET: at 12:42

## 2022-03-18 RX ADMIN — Medication 1000 UNITS: at 20:55

## 2022-03-18 RX ADMIN — ASPIRIN 81 MG: 81 TABLET, COATED ORAL at 09:37

## 2022-03-18 RX ADMIN — LOSARTAN POTASSIUM 25 MG: 25 TABLET, FILM COATED ORAL at 09:37

## 2022-03-18 ASSESSMENT — ENCOUNTER SYMPTOMS
PHOTOPHOBIA: 0
EYE PAIN: 0
WHEEZING: 0
EYE DISCHARGE: 0
SHORTNESS OF BREATH: 0
STRIDOR: 0
ABDOMINAL DISTENTION: 0
APNEA: 0
EYE REDNESS: 0
CHOKING: 0
CHEST TIGHTNESS: 0
COUGH: 0
EYE ITCHING: 0

## 2022-03-18 ASSESSMENT — PAIN SCALES - GENERAL
PAINLEVEL_OUTOF10: 3
PAINLEVEL_OUTOF10: 0
PAINLEVEL_OUTOF10: 0

## 2022-03-18 NOTE — PLAN OF CARE
Problem: Pain:  Goal: Pain level will decrease  Description: Pain level will decrease  3/18/2022 0440 by Adrianna Martines RN  Outcome: Ongoing  3/17/2022 2003 by Uday Ramirez RN  Outcome: Ongoing  Goal: Control of acute pain  Description: Control of acute pain  3/18/2022 0440 by Adrianna Martines RN  Outcome: Ongoing  3/17/2022 2003 by Uday Ramirez RN  Outcome: Ongoing  Goal: Control of chronic pain  Description: Control of chronic pain  Outcome: Ongoing     Problem: Falls - Risk of:  Goal: Will remain free from falls  Description: Will remain free from falls  3/18/2022 0440 by Adrianna Martines RN  Outcome: Ongoing  3/17/2022 2003 by Uday Ramirez RN  Outcome: Ongoing  Goal: Absence of physical injury  Description: Absence of physical injury  3/18/2022 0440 by Adrianna Martines RN  Outcome: Ongoing  3/17/2022 2003 by Uday Ramirez RN  Outcome: Ongoing

## 2022-03-18 NOTE — CONSULTS
Clinical Pharmacy Note  Vancomycin Consult    Acie Done is a 82 y.o. female ordered Vancomycin for sepsis of unknown origin; consult received from Dr. Emil Mcbride to manage therapy. Also receiving ceftriaxone. Patient Active Problem List   Diagnosis    Expressive aphasia    Acute cerebrovascular accident ([address]A) ([facility]       Allergies:  Aspirin and Lisinopril     Temp max:  Temp (24hrs), Av.3 °F (37.4 °C), Min:97.4 °F (36.3 °C), Max:102.4 °F (39.1 °C)      Recent Labs     22  0504 22  0538   WBC 7.0 7.7 6.9       Recent Labs     22  0504 22  0538   BUN 16 14 15   CREATININE 0.8 0.6 0.7         Intake/Output Summary (Last 24 hours) at 3/18/2022 0818  Last data filed at 3/18/2022 0439  Gross per 24 hour   Intake 560 ml   Output 400 ml   Net 160 ml       Culture Results:  pending    Ht Readings from Last 1 Encounters:   22 5' 2\" (1.575 m)        Wt Readings from Last 1 Encounters:   22 141 lb 12.1 oz (64.3 kg)         Estimated Creatinine Clearance: 52 mL/min (based on SCr of 0.7 mg/dL). Assessment/Plan:    Vanco day # 2  Received vanco 1250 mg x 1 yesterday  Random level - 6.8 mg/L this am  Will continue to dose intermittently based on levels     Vanco 1250 mg x 1 today  Random vanco level tomorrow am    Thank you for the consult. Juliann Marvin, PharmD.   3/18/2022  8:19 AM

## 2022-03-18 NOTE — PROGRESS NOTES
Infectious Disease Follow up Notes  Admit Date: 3/16/2022  Hospital Day: 3    Antibiotics :   IV Vancomycin   IV Ceftriaxone     CHIEF COMPLAINT:     Fevers  Change in mentation   Left side head aches   Procal elevation    Subjective interval History :  82 y.o. woman with a past medical history of hypertension, chronic anticoagulation, atrial fibrillation admitted to the hospital secondary to left-sided headache, scalp pain, fever, chills, some confusion. Patient was generally feeling very weak and tired and having left-sided headache was seen in [facility] ED, completed CT of the brain as well as CTA of the neck was negative she was given symptomatic treatment. Following this at home daughter noticed that her mother was more confused and weak hence brought her to the hospital for further evaluation. She recently also had a right lacrimal duct stent removal though Nasal endoscocpy on 3/9/21 , per family she was on oral amoxicillin therapy and completed the course recently . IN the hospital now developed fever T-max 102.4, ongoing left-sided scalp pain but no skin lesions. Location : Left side head aches and pain       Quality : aching        Severity : 8/10    Duration : 7 days       Timing : intermittent  Context : None    Modifying factors : none   Associated signs and symptoms: fevers, no cough no sputum no skin lesions no GI or  symptoms. Interval History : sitting up in the chair and fever trend down Left side scalp pain + no confusion, daughter at bed side     Past Medical History:    Past Medical History:   Diagnosis Date    Atrial fibrillation [facility])        Past Surgical History:    History reviewed. No pertinent surgical history.     Current Medications:    Outpatient Medications Marked as Taking for the 3/16/22 encounter [facility] Encounter)   Medication Sig Dispense Refill    alendronate (FOSAMAX) 70 MG tablet Take 70 mg by mouth every 7 days      atorvastatin (LIPITOR) 20 MG tablet Take 20 mg by mouth daily      budesonide (ENTOCORT EC) 3 MG extended release capsule Take 6 mg by mouth every morning      carvedilol (COREG) 12.5 MG tablet Take 12.5 mg by mouth 2 times daily (with meals)      losartan (COZAAR) 50 MG tablet Take 50 mg by mouth daily       rivaroxaban (XARELTO) 15 MG TABS tablet Take 15 mg by mouth daily (with breakfast)      vitamin D 25 MCG (1000 UT) CAPS Take 1,000 Units by mouth in the morning, at noon, and at bedtime      ferrous sulfate (IRON 325) 325 (65 Fe) MG tablet Take 325 mg by mouth daily (with breakfast)      Cyanocobalamin 5000 MCG TBDP Take 5,000 mcg by mouth every 30 days      cetirizine (ZYRTEC) 10 MG tablet Take 10 mg by mouth daily as needed for Allergies      acetaminophen (TYLENOL) 500 MG tablet Take 750 mg by mouth every 6 hours as needed for Pain      calcium carbonate (TUMS) 500 MG chewable tablet Take 1 tablet by mouth 3 times daily as needed for Heartburn      Multiple Vitamins-Minerals (THERAPEUTIC MULTIVITAMIN-MINERALS) tablet Take 1 tablet by mouth daily         Allergies:  Aspirin and Lisinopril    Immunizations :   Immunization History   Administered Date(s) Administered    COVID-19, Moderna, Booster, PF, 50mcg/0.25ml 11/04/2021    COVID-19, Moderna, Primary or Immunocompromised, PF, 100mcg/0.5mL 01/24/2021, 02/21/2021       Social History:   Social History     Tobacco Use    Smoking status: Not on file    Smokeless tobacco: Not on file   Substance Use Topics    Alcohol use: Not on file    Drug use: Not on file     Social History     Tobacco Use   Smoking Status Not on file   Smokeless Tobacco Not on file      Family History : no DVT no COPD       REVIEW OF SYSTEMS:      Constitutional:    fevers,+  Chills,+  night sweats  Eyes:  negative for blurred vision, eye discharge, visual disturbance   HEENT:  negative for hearing loss, ear drainage,nasal congestion  Respiratory:  negative for cough, shortness of breath or hemoptysis   Cardiovascular:  negative for chest pain, palpitations, syncope  Gastrointestinal:  negative for nausea, vomiting, diarrhea, constipation, abdominal pain  Genitourinary:  negative for frequency, dysuria, urinary incontinence, hematuria  Hematologic/Lymphatic:  negative for easy bruising, bleeding and lymphadenopathy  Allergic/Immunologic:  negative for recurrent infections, angioedema, anaphylaxis   Endocrine:  negative for weight changes, polyuria, polydipsia and polyphagia  Musculoskeletal:  negative for joint  pain, swelling, decreased range of motion  Integumentary: No rashes, skin lesions  Neurological:  negative for headaches, slurred speech, unilateral weakness  Psychiatric: negative for hallucinations,confusion,agitation.                 PHYSICAL EXAM:      Vitals:    BP (!) 157/75   Pulse 76   Temp 97.9 °F (36.6 °C) (Oral)   Resp 12   Ht 5' 2\" (1.575 m)   Wt 141 lb 12.1 oz (64.3 kg)   SpO2 95%   BMI 25.93 kg/m²     General Appearance: alert,in no acute distress, no pallor, no icterus   Skin: warm and dry, no rash or erythema  Head: normocephalic and atraumatic  Eyes: pupils equal, round, and reactive to light, conjunctivae normal  ENT: tympanic membrane, external ear and ear canal normal bilaterally, nose without deformity, nasal mucosa and turbinates normal without polyps  Neck: supple and non-tender without mass, no thyromegaly  no cervical lymphadenopathy  Pulmonary/Chest: clear to auscultation bilaterally- no wheezes, rales or rhonchi, normal air movement, no respiratory distress  Cardiovascular: normal rate, regular rhythm, normal S1 and S2, no murmurs, rubs, clicks, or gallops, no carotid bruits  Abdomen: soft, non-tender, non-distended, normal bowel sounds, no masses or organomegaly  Extremities: no cyanosis, clubbing or edema  Musculoskeletal: normal range of motion, no joint swelling, deformity or tenderness  Integumentary: No rashes, no abnormal skin lesions, no petechiae  Neurologic: reflexes normal and symmetric, no cranial nerve deficit  Psych:  Orientation, sensorium, mood normal            Lines: IV       Data Review:    CBC:   Lab Results   Component Value Date    WBC 6.9 03/18/2022    HGB 13.0 03/18/2022    HCT 38.3 03/18/2022    MCV 88.9 03/18/2022     03/18/2022     RENAL:   Lab Results   Component Value Date    CREATININE 0.7 03/18/2022    BUN 15 03/18/2022     03/18/2022    K 3.7 03/18/2022     03/18/2022    CO2 20 (L) 03/18/2022     SED RATE:   Lab Results   Component Value Date    SEDRATE 25 03/18/2022     CK: No results found for: CKTOTAL  CRP:   Lab Results   Component Value Date    CRP <3.0 03/18/2022     Hepatic Function Panel:   Lab Results   Component Value Date    ALKPHOS 60 07/25/2021    ALT 22 07/25/2021    AST 23 07/25/2021    PROT 5.6 07/25/2021    PROT 6.4 12/21/2010    BILITOT 0.9 07/25/2021    LABALBU 3.3 07/25/2021     UA:  Lab Results   Component Value Date    COLORU YELLOW 03/18/2022    CLARITYU Clear 03/18/2022    GLUCOSEU Negative 03/18/2022    BILIRUBINUR Negative 03/18/2022    KETUA 15 03/18/2022    SPECGRAV 1.019 03/18/2022    BLOODU SMALL 03/18/2022    PHUR 6.0 03/18/2022    PROTEINU TRACE 03/18/2022    UROBILINOGEN 0.2 03/18/2022    NITRU Negative 03/18/2022    LEUKOCYTESUR Negative 03/18/2022    LABMICR YES 03/18/2022    URINETYPE NotGiven 03/18/2022      Urine Microscopic:   Lab Results   Component Value Date    HYALCAST 1 03/18/2022    WBCUA 4 03/18/2022    RBCUA 13 03/18/2022    EPIU 1 03/18/2022     Urine Reflex to Culture: No results found for: URRFLXCULT      MICRO: cultures reviewed and updated by me   Blood Culture: No results found for: BC, BLOODCULT2    Respiratory Culture:  No results found for: Kathy Eliecer  AFB:No results found for: AFBSMEAR  Viral Culture:  No results found for: COVID19  Urine Culture: No results for input(s): Radha Constant in the last 72 hours. Micro results (current encounter only)    Procedure Component Value Units Date/Time   Respiratory Panel Film Array Report [[id number]] Collected: 03/18/22 0628   Order Status: Completed Updated: 03/18/22 0843    Report SEE IMAGE   Respiratory Panel, Molecular, with COVID-19 (Restricted: peds pts or suitable admitted adults) [[id number]] Collected: 03/18/22 0628   Order Status: Completed Specimen: Respiratory from Nasopharyngeal Updated: 03/18/22 0842    Respiratory Panel PCR --    Respiratory Pathogens Panel PCR Result: Not Detected   See additional report for complete Respiratory Pathogens Panel    Narrative:     ORDER#: [id number]                          ORDERED BY: Alonna Burkitt   SOURCE: Nasopharyngeal                     COLLECTED:  03/18/22 06:28   ANTIBIOTICS AT APRIL. :                      RECEIVED :  03/18/22 06:33   Culture, Urine [[id number]] Collected: 03/18/22 0420   Order Status: Sent Specimen: Urine, clean catch Updated: 03/18/22 0430   Culture, Blood 1 [[id number]] Collected: 03/17/22 1428   Order Status: Sent Specimen: Blood Updated: 03/17/22 1439   Culture, Blood 2 [[id number]] Collected: 03/17/22 1428   Order Status: Sent Specimen: Blood Updated: 03/17/22 1439       IMAGING:    MRI brain without contrast   Final Result   1. No acute intracranial abnormality. No acute infarct. 2. Minimal global parenchymal volume loss with mild chronic microvascular   ischemic changes. 3. Fluid is seen in the right sphenoid sinus. CT HEAD WO CONTRAST   Final Result   1. No acute intracranial abnormality. 2. Mild cerebral white matter chronic microvascular ischemic disease. 3. Soft tissue volume loss involving the left frontal scalp suggesting   association with scarring. Recommend clinical correlation.                All the pertinent images and reports for the current Hospitalization were reviewed by me     Scheduled Meds:   vancomycin  1,250 mg IntraVENous Once    budesonide  6 mg Oral QAM    vitamin D  1,000 Units Oral TID    [START ON 3/19/2022] ferrous sulfate  325 mg Oral Daily with breakfast    Cyanocobalamin  5,000 mcg Oral Q30 Days    therapeutic multivitamin-minerals  1 tablet Oral Daily    [Held by provider] rivaroxaban  15 mg Oral Daily with breakfast    atorvastatin  80 mg Oral Nightly    aspirin  81 mg Oral Daily    Or    aspirin  300 mg Rectal Daily    carvedilol  12.5 mg Oral BID WC    losartan  25 mg Oral Daily    cefTRIAXone (ROCEPHIN) IV  2,000 mg IntraVENous Q12H    vancomycin (VANCOCIN) intermittent dosing (placeholder)   Other RX Placeholder       Continuous Infusions:      PRN Meds:  cetirizine, calcium carbonate, ondansetron **OR** ondansetron, polyethylene glycol, perflutren lipid microspheres, labetalol, acetaminophen      Assessment:     Patient Active Problem List   Diagnosis    Expressive aphasia    Acute cerebrovascular accident (CVA) ([facility] [address]     Fevers chills  Left side head aches/scalp pain   Confusion now resolved  WBC normal  MRI brain  -ve  CT head -ve  Recent eval as out patient CTA neck -ve  Afib on chronic anticoagulation   Recent Rt Lacrimal duct stent removal on 3/9  Per note there was crusting and purulence was placed on oral abx course         Clinical exam is negative for any focus and no skin lesions on the scalp checking for HSV OR vzv, and MRI brain some fluid in the Rt sphenoid sinus but her symptoms or on the Left side of the scalp      Given negative exam and normal WBC bacterial infection low on the DDx but Procal came back very elevated and in light of fevers and Lab test may cont IV abx for now    If fevers improve and Blood cx remain negative may choose empiric oral abx for d/c planning       Labs, Microbiology, Radiology and all the pertinent results from current hospitalization and  care every where were reviewed  by me as a part of the evaluation   Plan:   1. Check Procal,= in AM   2.  COVID 19 and Resp virus PCR -VE  3. Blood cx IN PROCESS  4. UA and Urine cx -VE  5. MRI brain -ve  6. Cont IV Ceftriaxone may lower the dose x 2 gm Q 24 hrs  and IV Vancomycin pending Blood cx   7 Care every where records reviewed  8. Home soon on oral abx once clinically better          Discussed with patient/Family and Nursing d/w daughter at bed side   Risk of Complications/Morbidity: High      · Illness(es)/ Infection present that pose threat to bodily function. · There is potential for severe exacerbation of infection/side effects of treatment. Therapy requires intensive monitoring for antimicrobial agent toxicity. Discussed with patient/Family and Nursing staff     Thanks for allowing me to participate in your patient's care and please call me with any questions or concerns.     Rosita Faustin MD  Infectious Disease  [facility] ([facility]) Physician  Phone: [phone]   Fax : [phone]

## 2022-03-18 NOTE — PROGRESS NOTES
Physical Therapy  Facility/Department: [address] PROGRESSIVE CARE  Daily Treatment Note  NAME: Christopher Day  : 1935  MRN: [id number]    Date of Service: 3/18/2022    Discharge Recommendations:  Continue to assess pending progress,5-7 sessions per week   Christopher Day scored a 19/24 on the AM-PAC short mobility form. Current research shows that an AM-PAC score of 17 or less is typically not associated with a discharge to the patient's home setting. Based on the patient's AM-PAC score and their current functional mobility deficits, it is recommended that the patient have 5-7 sessions per week of Physical Therapy at d/c to increase the patient's independence. At this time, this patient demonstrates the endurance, and/or tolerance for 3 hours of therapy each day, with a treatment frequency of 5-7x/wk. Please see assessment section for further patient specific details. If patient discharges prior to next session this note will serve as a discharge summary. Please see below for the latest assessment towards goals. Assessment   Body structures, Functions, Activity limitations: Decreased functional mobility ; Decreased safe awareness;Decreased cognition;Decreased balance  Assessment: 81 y/o female admit 3/16/2022 with Confusion and 3 separate episodes of Aphasia. To ED ([facility] x 2 with Confusion/Headache. CT head negative; d/c home. Since admit CT Head negative. MRI pending. PMH as noted including TIA, A-Fib, Recent Stent removed from Lacrimal Duct. PTA pt living with  in home with few steps to enter and 1st floor bed/bath; independent daily care and functional mobility (without assist device). Pt loc oob, amb short distances within hospital room setting; improved with use of Walker. Strength appears functional/comparable although does appear to have some difficulty with processing/motor planning at times.   At this time, would recommend cont PT (5-7) although will monitor pt's progress. Prognosis: Good  Decision Making: Medium Complexity  History: 83 y/o female admit 3/16/2022 with Confusion and 3 separate episodes of Aphasia. To ED ([facility]) x 2 with Confusion/Headache. CT head negative; d/c home. Since admit CT Head negative. MRI pending. PMH as noted including TIA, A-Fib, Recent Stent removed from Lacrimal Duct. Exam: See above. Clinical Presentation: See above. Patient Education: Role of PT, POC, Need to call for assist, Safe use of Walker. Barriers to Learning: Cognitive, Processing. REQUIRES PT FOLLOW UP: Yes  Activity Tolerance  Activity Tolerance: Patient Tolerated treatment well  Activity Tolerance: Pt loc oob, amb short distances within hospital room setting; improved with use of Walker. Strength appears functional/comparable although does appear to have some difficulty with processing/motor planning at times. Patient Diagnosis(es): The encounter diagnosis was Stroke-like symptoms. has a past medical history of Atrial fibrillation ([facility] [address] has no past surgical history on file. Restrictions  Restrictions/Precautions  Restrictions/Precautions: Fall Risk  Subjective   General  Chart Reviewed: Yes  Additional Pertinent Hx: 83 y/o female admit 3/16/2022 with Confusion and 3 separate episodes of Aphasia. To ED ([facility]) x 2 with Confusion/Headache. CT head negative; d/c home. Since admit CT Head negative. MRI negative. PMH as noted including TIA, A-Fib, Recent Stent removed from Lacrimal Duct. Response To Previous Treatment: Patient with no complaints from previous session. Family / Caregiver Present: Yes (Dtr.)  Referring Practitioner: Bret Darling NP. Subjective  Subjective: Pt/dtr agreeable to PT Rx. Orientation  Orientation  Overall Orientation Status: Within Functional Limits (Appears alittle forgetful at times.   Limited carryover.)  Cognition   Cognition  Overall Cognitive Status: Exceptions  Arousal/Alertness: Appropriate responses to stimuli  Following Commands: Follows one step commands with increased time  Memory: Decreased recall of recent events  Safety Judgement: Decreased awareness of need for safety  Insights: Decreased awareness of deficits  Cognition Comment: Cont abit forgetful; diminished carryover at times. Objective   Bed mobility  Supine to Sit: Minimal assistance (HOB elevated.)  Transfers  Sit to Stand: Contact guard assistance (With Melissa Salgado. Cues for safe hand placement.)  Stand to sit: Contact guard assistance (With Melissa Yarsanism. Cues for safe hand placement.)  Comment: Toilet Transfer CGA. Pt able to complete pericare; some assist with managing undergarments (abit tight pulling up/down). Pt stood at sink to wash hands CGA. Ambulation  Ambulation?: Yes  Ambulation 1  Surface: level tile  Distance: Pt amb to/from bathroom, additional 40' x 2 with Walker Slight CGA. Cues for upright posture, slight diminished step length/clearance. Cues for safe positioning and transitional mvts. Strength Other  Other: Strength comparable R/L; adequate for functional activitiets. AM-PAC Score  -PAC Inpatient Mobility Raw Score : 19 (03/18/22 1434)  -PAC Inpatient T-Scale Score : 45.44 (03/18/22 1434)  Mobility Inpatient CMS 0-100% Score: 41.77 (03/18/22 1434)  Mobility Inpatient CMS G-Code Modifier : CK (03/18/22 1434)          Goals  Short term goals  Time Frame for Short term goals: Upon d/c acute care setting. Short term goal 1: Bed Mob Supervision. Short term goal 2: Transfers with/without assist device Supervision. Short term goal 3: Amb with/without assist device 100' SBA/Supervision. Patient Goals   Patient goals : Return home. Plan    Plan  Times per week: 3-5x week while in acute care setting.   Current Treatment Recommendations: Functional Mobility Training,Transfer Training,Balance [employer] Education & Training,Patient/Caregiver Education & Training  Safety Devices  Type of devices: Call light within reach,Chair alarm in place,Left in chair,Nurse notified     Therapy Time   Individual Concurrent Group Co-treatment   Time In 1 Tadeo Anh         Time Out 1215         Minutes [address] [place], PT

## 2022-03-18 NOTE — PROGRESS NOTES
Department of Physical Medicine & Rehabilitation  Progress Note    Patient Identification:  Hoda Hale  [id number]  : 1935  Admit date: 3/16/2022    Chief Complaint: Expressive aphasia    Subjective:   No acute events overnight. Patient seen this afternoon sitting up in room. She reports intermittent posterior headache. Still some difficulty with word finding and \"slow\" speech, but overall continues to be improved since admission. Still feeling somewhat unsteady with mobility as well. Labs reviewed. ROS: No f/c, n/v, cp     Objective:  Patient Vitals for the past 24 hrs:   BP Temp Temp src Pulse Resp SpO2 Weight   22 0938  97.9 °F (36.6 °C) Oral       22 0849 (!) 157/75 99.7 °F (37.6 °C) Oral 76 12 95 %    22 0454       141 lb 12.1 oz (64.3 kg)   22 0325 (!) 158/70 98 °F (36.7 °C) Oral 85 19 95 %    22 2330 134/63 98.7 °F (37.1 °C) Oral 90 23 91 %    22 2109  100.4 °F (38 °C) Oral       22 1917 (!) 143/69 98.6 °F (37 °C) Oral   100 %    22 1915 (!) 143/69 98.6 °F (37 °C) Oral 70 18 97 %    22 1722 (!) 169/77 97.4 °F (36.3 °C) Oral 71 18 97 %      Const: Alert. No distress, pleasant. HEENT: Normocephalic, atraumatic. Normal sclera/conjunctiva. MMM. CV: Regular rate and rhythm. Resp: No respiratory distress. Lungs CTAB. Abd: Soft, nontender, nondistended, NABS+   Ext: No edema. Neuro: Alert, oriented. Mildly impaired recall. Speech overall fluent with some intermittent anomia, ? Delayed processing. Psych: Cooperative, appropriate mood and affect    Laboratory data: Available via EMR.    Last 24 hour lab  Recent Results (from the past 24 hour(s))   Urinalysis    Collection Time: 22  4:20 AM   Result Value Ref Range    Color, UA YELLOW Straw/Yellow    Clarity, UA Clear Clear    Glucose, Ur Negative Negative mg/dL    Bilirubin Urine Negative Negative    Ketones, Urine 15 (A) Negative mg/dL    Specific Gravity, UA 1.019 1.005 - 1.030    Blood, Urine SMALL (A) Negative    pH, UA 6.0 5.0 - 8.0    Protein, UA TRACE (A) Negative mg/dL    Urobilinogen, Urine 0.2 <2.0 E.U./dL    Nitrite, Urine Negative Negative    Leukocyte Esterase, Urine Negative Negative    Microscopic Examination YES     Urine Type NotGiven    Microscopic Urinalysis    Collection Time: 03/18/22  4:20 AM   Result Value Ref Range    Hyaline Casts, UA 1 0 - 8 /LPF    WBC, UA 4 0 - 5 /HPF    RBC, UA 13 (H) 0 - 4 /HPF    Epithelial Cells, UA 1 0 - 5 /HPF   Basic Metabolic Panel    Collection Time: 03/18/22  5:38 AM   Result Value Ref Range    Sodium 137 136 - 145 mmol/L    Potassium 3.7 3.5 - 5.1 mmol/L    Chloride 101 99 - 110 mmol/L    CO2 20 (L) 21 - 32 mmol/L    Anion Gap 16 3 - 16    Glucose 105 (H) 70 - 99 mg/dL    BUN 15 7 - 20 mg/dL    CREATININE 0.7 0.6 - 1.2 mg/dL    GFR Non-African American >60 >60    GFR African American >60 >60    Calcium 8.5 8.3 - 10.6 mg/dL   CBC with Auto Differential    Collection Time: 03/18/22  5:38 AM   Result Value Ref Range    WBC 6.9 4.0 - 11.0 K/uL    RBC 4.31 4.00 - 5.20 M/uL    Hemoglobin 13.0 12.0 - 16.0 g/dL    Hematocrit 38.3 36.0 - 48.0 %    MCV 88.9 80.0 - 100.0 fL    MCH 30.2 26.0 - 34.0 pg    MCHC 34.0 31.0 - 36.0 g/dL    RDW 15.3 12.4 - 15.4 %    Platelets 380 476 - 245 K/uL    MPV 7.7 5.0 - 10.5 fL    Neutrophils % 67.6 %    Lymphocytes % 18.4 %    Monocytes % 13.6 %    Eosinophils % 0.2 %    Basophils % 0.2 %    Neutrophils Absolute 4.7 1.7 - 7.7 K/uL    Lymphocytes Absolute 1.3 1.0 - 5.1 K/uL    Monocytes Absolute 0.9 0.0 - 1.3 K/uL    Eosinophils Absolute 0.0 0.0 - 0.6 K/uL    Basophils Absolute 0.0 0.0 - 0.2 K/uL   Procalcitonin    Collection Time: 03/18/22  5:38 AM   Result Value Ref Range    Procalcitonin 13.38 (H) 0.00 - 0.15 ng/mL   Sedimentation Rate    Collection Time: 03/18/22  5:38 AM   Result Value Ref Range    Sed Rate 25 0 - 30 mm/Hr   C-Reactive Protein    Collection Time: 03/18/22  5:38 AM   Result Value Ref Range    CRP <3.0 0.0 - 5.1 mg/L   Vancomycin Level, Random    Collection Time: 03/18/22  5:38 AM   Result Value Ref Range    Vancomycin Rm 6.8 ug/mL   Respiratory Panel, Molecular, with COVID-19 (Restricted: peds pts or suitable admitted adults)    Collection Time: 03/18/22  6:28 AM    Specimen: Nasopharyngeal; Respiratory   Result Value Ref Range    Respiratory Panel PCR       Respiratory Pathogens Panel PCR Result: Not Detected  See additional report for complete Respiratory Pathogens Panel     Respiratory Panel Film Array Report    Collection Time: 03/18/22  6:28 AM   Result Value Ref Range    Report SEE IMAGE        Therapy progress:  PT     Objective     Sit to Stand: Contact guard assistance (With [place]. Cues for safe hand placement.)  Stand to sit: Contact guard assistance (With [place]. Cues for safe hand placement.)  Distance: Pt amb to/from bathroom, additional 40' x 2 with Walker Slight CGA. Cues for upright posture, slight diminished step length/clearance. Cues for safe positioning and transitional mvts. OT  PT Equipment Recommendations  Other: Will monitor for potential equipt needs. Toilet - Technique: Ambulating (RW)  Equipment Used: Grab bars  Assessment        SLP  Diet Solids Recommendation: Regular  Liquid Consistency Recommendation: Thin    Body mass index is 25.93 kg/m². Assessment:  Mixed Expressive/Receptive Aphasia  ? Metabolic encephalopathy  Fever  Afib  HTN  HLD  H/o TIA     Recommendations:     At this point, appears patient would likely benefit from rehab prior to returning home. Will continue to follow progress with ongoing medical work-up/treatment.      Thank you for this consult. Please contact me with any questions or concerns. Chin Wells.  Ozzie White MD 3/18/2022, 4:32 PM

## 2022-03-18 NOTE — PROGRESS NOTES
Hospitalist Progress Note      PCP: Priscila Aragon MD    Date of Admission: 3/16/2022    Chief Complaint: [facility] Course: 26-year-old female presented to the hospital with multiple episodes of confusion/aphasia. Of note patient had intervention performed lacrimal duct of the eye with a stent removal recently and has been on antibiotics which was just stopped on Tuesday couple of days ago. Patient has been febrile while in the hospital.  Will start IV antibiotics and consult ID, blood cultures will be ordered    Subjective: No acute events reported overnight. Patient states that she feels overall better. She does complain of a headache this morning but otherwise feels okay      Medications:  Reviewed    Infusion Medications   Scheduled Medications    vancomycin  1,250 mg IntraVENous Once    atorvastatin  80 mg Oral Nightly    aspirin  81 mg Oral Daily    Or    aspirin  300 mg Rectal Daily    carvedilol  12.5 mg Oral BID WC    losartan  25 mg Oral Daily    cefTRIAXone (ROCEPHIN) IV  2,000 mg IntraVENous Q12H    vancomycin (VANCOCIN) intermittent dosing (placeholder)   Other RX Placeholder     PRN Meds: ondansetron **OR** ondansetron, polyethylene glycol, perflutren lipid microspheres, labetalol, acetaminophen      Intake/Output Summary (Last 24 hours) at 3/18/2022 1149  Last data filed at 3/18/2022 0439  Gross per 24 hour   Intake 320 ml   Output 400 ml   Net -80 ml       Physical Exam Performed:    BP (!) 157/75   Pulse 76   Temp 97.9 °F (36.6 °C) (Oral)   Resp 12   Ht 5' 2\" (1.575 m)   Wt 141 lb 12.1 oz (64.3 kg)   SpO2 95%   BMI 25.93 kg/m²     General appearance: No apparent distress, appears stated age and cooperative. HEENT: Pupils equal, round, and reactive to light. Conjunctivae/corneas clear. Neck: Supple, with full range of motion. No jugular venous distention. Trachea midline. Respiratory:  Normal respiratory effort.  Clear to auscultation, bilaterally without Rales/Wheezes/Rhonchi. Cardiovascular: Regular rate and rhythm with normal S1/S2 without murmurs, rubs or gallops. Abdomen: Soft, non-tender, non-distended with normal bowel sounds. Musculoskeletal: No clubbing, cyanosis or edema bilaterally. Full range of motion without deformity. Skin: Skin color, texture, turgor normal.  No rashes or lesions. Neurologic:  Neurovascularly intact without any focal sensory/motor deficits. Cranial nerves: II-XII intact, grossly non-focal.  Psychiatric: Alert and oriented, thought content appropriate, normal insight  Capillary Refill: Brisk,3 seconds, normal   Peripheral Pulses: +2 palpable, equal bilaterally       Labs:   Recent Labs     03/16/22 2239 03/17/22  0504 03/18/22  0538   WBC 7.0 7.7 6.9   HGB 13.5 13.3 13.0   HCT 40.6 40.4 38.3    141 140     Recent Labs     03/16/22 2239 03/17/22  0504 03/18/22  0538   * 134* 137   K 3.8 3.6 3.7   CL 99 99 101   CO2 20* 18* 20*   BUN 16 14 15   CREATININE 0.8 0.6 0.7   CALCIUM 9.1 8.7 8.5     No results for input(s): AST, ALT, BILIDIR, BILITOT, ALKPHOS in the last 72 hours. No results for input(s): INR in the last 72 hours. No results for input(s): Burnice Meriwether in the last 72 hours. Urinalysis:      Lab Results   Component Value Date    NITRU Negative 03/18/2022    WBCUA 4 03/18/2022    RBCUA 13 03/18/2022    BLOODU SMALL 03/18/2022    SPECGRAV 1.019 03/18/2022    GLUCOSEU Negative 03/18/2022       Radiology:  MRI brain without contrast   Final Result   1. No acute intracranial abnormality. No acute infarct. 2. Minimal global parenchymal volume loss with mild chronic microvascular   ischemic changes. 3. Fluid is seen in the right sphenoid sinus. CT HEAD WO CONTRAST   Final Result   1. No acute intracranial abnormality. 2. Mild cerebral white matter chronic microvascular ischemic disease.    3. Soft tissue volume loss involving the left frontal scalp suggesting association with scarring. Recommend clinical correlation. Assessment/Plan:    Active Hospital Problems    Diagnosis     Expressive aphasia [R47.01]     Acute cerebrovascular accident (CVA) ([facility] [address] [I63.9]      Acute encephalopathy/aphasia  Patient symptoms have mostly resolved, likely infectious etiology of symptoms  MRI of the brain negative for acute CVA  Neurology following  Continue neurochecks  Continue empiric vancomycin/Rocephin. ID has been consulted  Procalcitonin elevated to 13  Await blood cultures    Hypertension  Continue losartan, Coreg    Atrial fibrillation  Continue Coreg, hold Xarelto for now in case LP is required    DVT Prophylaxis: SCD  Diet: ADULT DIET;  Regular  Code Status: Full Code    PT/OT Eval Status: pending    Dispo - pending clinical course    Chyna Rubio MD

## 2022-03-18 NOTE — PLAN OF CARE
Problem: Pain:  Description: Pain management should include both nonpharmacologic and pharmacologic interventions.   Goal: Pain level will decrease  Description: Pain level will decrease  3/18/2022 1107 by Armando Coronado RN  Outcome: Ongoing    Goal: Control of acute pain  Description: Control of acute pain  3/18/2022 1107 by Armando Coronado RN  Outcome: Ongoing       Problem: Falls - Risk of:  Goal: Will remain free from falls  Description: Will remain free from falls  3/18/2022 1107 by Armando Coronado RN  Outcome: Ongoing    Goal: Absence of physical injury  Description: Absence of physical injury  3/18/2022 1107 by Armando Coronado RN  Outcome: Ongoing

## 2022-03-18 NOTE — ED PROVIDER NOTES
EMERGENCY DEPARTMENT ENCOUNTER      Pt Name: Hector Edwards  MRN: [id number]  [place] 1935  Date of evaluation: 3/16/2022  Provider: Ana Paula Garcia MD    CHIEF COMPLAINT       Chief Complaint   Patient presents with    Fall     Pt from home with . In to ED via [facility] per their report \"Pt fell getting up out of chair. \" Pt's daughter called EMS and stated her mom \" seems confused since 6pm last night. \" EMS further reports that the Pt was seen at [address] last night, \" CT was done and they didnt see anything. \" Pt presents tonight to ED A&o x4 at this time on room air. pain 5/10 Headache, pt denies hitting head. HISTORY OF PRESENT ILLNESS    Hector Edwards is a 82 y.o. female who presents to the emergency department with weakness. Patient has had multiple episodes where she has fallen. Per family patient had an episode where she was having issues talking. Was seen at [facility] day prior with reassuring work-up. States she continues to feel intermittently weak. Has had multiple falls. Currently without symptoms. Never happened before. No other associated symptoms. Nursing Notes were reviewed. Including nursing noted for FM, Surgical History, Past Medical History, Social History, vitals, and allergies; agree with all. REVIEW OF SYSTEMS       Review of Systems   Constitutional: Negative for activity change, appetite change, chills, diaphoresis, fatigue, fever and unexpected weight change. HENT: Negative for congestion, dental problem, drooling and ear discharge. Eyes: Negative for photophobia, pain, discharge, redness and itching. Respiratory: Negative for apnea, cough, choking, chest tightness, shortness of breath, wheezing and stridor. Cardiovascular: Negative for chest pain and leg swelling. Gastrointestinal: Negative for abdominal distention. Endocrine: Negative for polyphagia and polyuria.    Genitourinary: Negative for vaginal bleeding, vaginal discharge and vaginal pain. Musculoskeletal: Negative for arthralgias. Neurological: Positive for weakness. Negative for tremors, facial asymmetry and headaches. Hematological: Negative for adenopathy. Does not bruise/bleed easily. Psychiatric/Behavioral: Negative for agitation, behavioral problems, confusion, decreased concentration, dysphoric mood, hallucinations, self-injury, sleep disturbance and suicidal ideas. The patient is not nervous/anxious and is not hyperactive. Except as noted above the remainder of the review of systems was reviewed and negative. PAST MEDICAL HISTORY     Past Medical History:   Diagnosis Date    Atrial fibrillation [facility])        SURGICAL HISTORY     History reviewed. No pertinent surgical history.     CURRENT MEDICATIONS       Current Discharge Medication List      CONTINUE these medications which have NOT CHANGED    Details   alendronate (FOSAMAX) 70 MG tablet Take 70 mg by mouth every 7 days      atorvastatin (LIPITOR) 20 MG tablet Take 20 mg by mouth daily      budesonide (ENTOCORT EC) 3 MG extended release capsule Take 6 mg by mouth every morning      carvedilol (COREG) 12.5 MG tablet Take 12.5 mg by mouth 2 times daily (with meals)      losartan (COZAAR) 50 MG tablet Take 50 mg by mouth daily       rivaroxaban (XARELTO) 15 MG TABS tablet Take 15 mg by mouth daily (with breakfast)      vitamin D 25 MCG (1000 UT) CAPS Take 1,000 Units by mouth in the morning, at noon, and at bedtime      ferrous sulfate (IRON 325) 325 (65 Fe) MG tablet Take 325 mg by mouth daily (with breakfast)      Cyanocobalamin 5000 MCG TBDP Take 5,000 mcg by mouth every 30 days      cetirizine (ZYRTEC) 10 MG tablet Take 10 mg by mouth daily as needed for Allergies      acetaminophen (TYLENOL) 500 MG tablet Take 750 mg by mouth every 6 hours as needed for Pain      calcium carbonate (TUMS) 500 MG chewable tablet Take 1 tablet by mouth 3 times daily as needed for Heartburn      Multiple Vitamins-Minerals (THERAPEUTIC MULTIVITAMIN-MINERALS) tablet Take 1 tablet by mouth daily             ALLERGIES     Aspirin and Lisinopril    FAMILY HISTORY      History reviewed. No pertinent family history. SOCIAL HISTORY       Social History     Socioeconomic History    Marital status:      Spouse name: None    Number of children: None    Years of education: None    Highest education level: None   Occupational History    None   Tobacco Use    Smoking status: None    Smokeless tobacco: None   Substance and Sexual Activity    Alcohol use: None    Drug use: None    Sexual activity: None   Other Topics Concern    None   Social History Narrative    None     Social Determinants of Health     Financial Resource Strain:     Difficulty of Paying Living Expenses: Not on file   Food Insecurity:     Worried About Running Out of Food in the Last Year: Not on file    Alfredo of Food in the Last Year: Not on file   Transportation Needs:     Lack of Transportation (Medical): Not on file    Lack of Transportation (Non-Medical):  Not on file   Physical Activity:     Days of Exercise per Week: Not on file    Minutes of Exercise per Session: Not on file   Stress:     Feeling of Stress : Not on file   Social Connections:     Frequency of Communication with Friends and Family: Not on file    Frequency of Social Gatherings with Friends and Family: Not on file    Attends [employer] Services: Not on file    Active Member of [address] [place] [address] or Organizations: Not on file    Attends Club or Organization Meetings: Not on file    Marital Status: Not on file   Intimate Partner Violence:     Fear of Current or Ex-Partner: Not on file    Emotionally Abused: Not on file    Physically Abused: Not on file    Sexually Abused: Not on file   Housing Stability:     Unable to Pay for Housing in the Last Year: Not on file    Number of [place] in the Last Year: Not on file    Unstable Housing in the Last Year: Not on file       PHYSICAL EXAM       ED Triage Vitals   BP Temp Temp Source Pulse Resp SpO2 Height Weight   03/16/22 2146 03/16/22 2146 03/16/22 2146 03/16/22 2146 03/16/22 2146 03/16/22 2146 03/17/22 0242 03/16/22 2333   (!) 155/75 98.7 °F (37.1 °C) Oral 74 18 94 % 5' 2\" (1.575 m) 139 lb 8.8 oz (63.3 kg)       Physical Exam  Vitals and nursing note reviewed. Constitutional:       General: She is not in acute distress. Appearance: She is well-developed. She is not ill-appearing, toxic-appearing or diaphoretic. HENT:      Head: Normocephalic and atraumatic. Right Ear: External ear normal.      Left Ear: External ear normal.   Eyes:      General:         Right eye: No discharge. Left eye: No discharge. Conjunctiva/sclera: Conjunctivae normal.      Pupils: Pupils are equal, round, and reactive to light. Cardiovascular:      Rate and Rhythm: Normal rate and regular rhythm. Heart sounds: No murmur heard. Pulmonary:      Effort: Pulmonary effort is normal. No respiratory distress. Breath sounds: Normal breath sounds. No wheezing or rales. Abdominal:      General: Bowel sounds are normal. There is no distension. Palpations: Abdomen is soft. There is no mass. Tenderness: There is no abdominal tenderness. There is no guarding or rebound. Genitourinary:     Comments: Deferred  Musculoskeletal:         General: No deformity. Normal range of motion. Cervical back: Normal range of motion and neck supple. Skin:     General: Skin is warm. Findings: No erythema or rash. Neurological:      Mental Status: She is alert and oriented to person, place, and time. She is not disoriented. Cranial Nerves: No cranial nerve deficit. Motor: No atrophy or abnormal muscle tone. Coordination: Coordination normal.   Psychiatric:         Behavior: Behavior normal.         Thought Content:  Thought content normal.         DIAGNOSTIC RESULTS     RADIOLOGY:   Non-plain film images such as CT, Ultrasoundand MRI are read by the radiologist. Plain radiographic images are visualized and preliminarily interpreted by the emergency physician with the below findings:    CT head reassuring    ED BEDSIDE ULTRASOUND:   Performed by ED Physician - none    LABS:  Labs Reviewed   CBC WITH AUTO DIFFERENTIAL - Abnormal; Notable for the following components:       Result Value    RDW 15.6 (*)     Lymphocytes Absolute 0.7 (*)     All other components within normal limits   BASIC METABOLIC PANEL W/ REFLEX TO MG FOR LOW K - Abnormal; Notable for the following components:    Sodium 135 (*)     CO2 20 (*)     Glucose 125 (*)     All other components within normal limits   CBC - Abnormal; Notable for the following components:    RDW 15.5 (*)     All other components within normal limits   LIPID PANEL - Abnormal; Notable for the following components:    HDL 68 (*)     All other components within normal limits   BASIC METABOLIC PANEL W/ REFLEX TO MG FOR LOW K - Abnormal; Notable for the following components:    Sodium 134 (*)     CO2 18 (*)     Anion Gap 17 (*)     Glucose 102 (*)     All other components within normal limits   CULTURE, BLOOD 1   CULTURE, BLOOD 2   RESPIRATORY PANEL, MOLECULAR, WITH COVID-19   CULTURE, URINE   AMMONIA   TSH WITH REFLEX   HEMOGLOBIN T5R   BASIC METABOLIC PANEL   CBC WITH AUTO DIFFERENTIAL   PROCALCITONIN   SEDIMENTATION RATE   C-REACTIVE PROTEIN   URINALYSIS       All other labs were withinnormal range or not returned as of this dictation. EMERGENCY DEPARTMENT COURSE and DIFFERENTIAL DIAGNOSIS/MDM:     PMH, Surgical Hx, FH, Social Hx reviewed by myself (ETOH usage, Tobacco usage, Drug usage reviewed by myself, no pertinent Hx)- No Pertinent Hx     Old records were reviewed by me     82-year-old with multiple TIA-like symptom occurrences. CT head reassuring today. She is back to her baseline. Will admit for neurology to see.     CRITICAL CARE TIME   Total Critical Caretime

## 2022-03-18 NOTE — CARE COORDINATION
INITIAL CASE MANAGEMENT ASSESSMENT    Reviewed chart, met with patient & daughter Chris Gill to assess possible discharge needs. Explained Case Management role/services. Living Situation: Confirmed address, lives with spouse (wheelchair bound); 1 level condo, ramped entrance    ADLs: Mostly independent, family assists with homemaking duties     DME: Rolling walker, grab bars, shower chair, over the toilet hand rails    PT/OT Recs: Discharge Recommendations:  Continue to assess pending progress,5-7 sessions per week   PT Equipment Recommendations  Other: Will monitor for potential equipt needs. Continue to assess pending progress,5-7 sessions per week,Patient would benefit from continued therapy after discharge,2-3 sessions per week,24 hour supervision or assist (home with 24hr and HHOT vs placement pending assist available at D/C)  OT Equipment Recommendations  Equipment Needed: Yes  Mobility Devices: [place]: Rolling     Active Services: None     Transportation: Active , family transportation     Medications: Uses [facility] -- no issues affording/obtaining medications    PCP: Marichuy Street MD      HD/PD: n/a    PLAN/COMMENTS: Patient declines rehab and wants to return home. Daughter Chris Gill tells me family can provide 24/7 assist. Both agree to home care. Would like referrals to [facility] (or back up [facility]). Referral made. The Plan for Transition of Care is related to the following treatment goals: strengthening    The patient's daughter was provided with a choice of provider and agrees   with the discharge plan. [x] Yes [] No    Freedom of choice list was provided with basic dialogue that supports the patient's individualized plan of care/goals, treatment preferences and shares the quality data associated with the providers. [x] Yes [] No    SW/CM provided contact information for patient or family to call with any questions.   SW/CM will follow and assist as needed. Electronically signed by Zoë Gamez RN Case Management [phone] on 3/18/2022 at 12:04 PM     Spoke to [facility] with [facility], they can accept. Need home care orders at discharge.   Electronically signed by Zoë Gamez RN on 3/18/2022 at 12:08 PM

## 2022-03-18 NOTE — PROGRESS NOTES
Occupational Therapy  Facility/Department: [address] PROGRESSIVE CARE  Daily Treatment Note and Tentative D/C    NAME: Tawnya Rowe  : 1935  MRN: [id number]    Date of Service: 3/18/2022    Discharge Recommendations: Tawnya Rowe scored a 19/24 on the AM-PAC ADL Inpatient form. Current research shows that an AM-PAC score of 17 or less is typically not associated with a discharge to the patient's home setting. If patient discharges prior to next session this note will serve as a discharge summary. Please see below for the latest assessment towards goals. Continue to assess pending progress,5-7 sessions per week,Patient would benefit from continued therapy after discharge,2-3 sessions per week,24 hour supervision or assist (home with 24hr and HHOT vs placement pending assist available at D/C)  OT Equipment Recommendations  Equipment Needed: Yes  Mobility Devices: Maria Elena Link: Rolling    Assessment   Performance deficits / Impairments: Decreased functional mobility ; Decreased strength;Decreased endurance;Decreased ADL status; Decreased safe awareness;Decreased cognition;Decreased balance;Decreased high-level IADLs  Assessment: Pt tolerated session well. Pt with improved cognition and speech this date although still forgetful. Pt completes bed mobility with Min A. Pt completes functional mobility and transfers with CGA and use of RW. Pt with improved gait this date. Pt with no LOB. Pt completes toileting and grooming in stance at sink with CGA and min cues. Continue with POC.   Prognosis: Good  Assistance / Modification: RW  OT Education: OT Role;Transfer Training;Plan of Care;ADL Adaptive Strategies  REQUIRES OT FOLLOW UP: Yes  Activity Tolerance  Activity Tolerance: Patient Tolerated treatment well  Safety Devices  Safety Devices in place: Yes  Type of devices: Left in chair;Chair alarm in place;Call light within reach;Gait belt;Nurse notified         Patient Diagnosis(es): The encounter diagnosis was Stroke-like symptoms. has a past medical history of Atrial fibrillation ([address] has no past surgical history on file. Restrictions  Restrictions/Precautions  Restrictions/Precautions: Fall Risk  Subjective   General  Chart Reviewed: Yes  Patient assessed for rehabilitation services?: Yes  Additional Pertinent Hx: per CNP note, 82 y.o. female with PMHx of A-fib, TIA, HTN, HLD and recent stent removal from lacrimal duct presented to [facility] with confusion and 3 separate episodes of  expressive aphasia in the last 2 days. Patient was seen at [address] twice in that timeframe and discharged home both times after neg workup with CT head without contrast and CTA head and neck. Symptoms returned again tonight. She is now weak and almost fell tonight while trying to stand and walk. Family immediately called 911 instead of having her try and stand again. She denies unilateral weakness or paresthesias. She does feel like it is difficult to get the right words out but family has not noticed any slurring of her words. No facial droop. Family / Caregiver Present: No  Referring Practitioner: SHIRLEY Sullivan CNP  Subjective  Subjective: Pt agreeable to OT treatment. Pt with no reports of pain. Pt with improved speech and cognition this date. Orientation  Orientation  Overall Orientation Status: Within Functional Limits  Orientation Level: Oriented X4  Objective    ADL  Grooming: Contact guard assistance (in stance at sink to wash hands)  Toileting: Contact guard assistance (CGA for balance; pt able to complete pericare seated; pt able to manage pants although forgetting to pull pants up before attempting to ambulate)        Balance  Sitting Balance: Independent  Standing Balance: Contact guard assistance (RW)  Functional Mobility  Functional - Mobility Device: Rolling Walker  Activity: Other; To/from bathroom (~15ft, 25ft, 40ft)  Assist Level: Contact guard assistance  Functional Mobility Comments: no LOB; pt with improved gait and balance  Toilet Transfers  Toilet - Technique: Ambulating (RW)  Equipment Used: Grab bars  Toilet Transfer: Contact guard assistance  Wheelchair Bed Transfers  Wheelchair/Bed - Technique: Ambulating (RW)  Equipment Used: Bed;Other (bed to chair)  Level of Asssistance: Contact guard assistance  Bed mobility  Supine to Sit: Minimal assistance  Sit to Supine: Unable to assess  Scooting: Unable to assess  Transfers  Sit to stand: Contact guard assistance  Stand to sit: Contact guard assistance  Transfer Comments: to/from RW; cues for hand placement              Cognition  Overall Cognitive Status: Exceptions  Arousal/Alertness: Appropriate responses to stimuli  Following Commands:  Follows one step commands with increased time  Memory: Decreased recall of recent events  Safety Judgement: Decreased awareness of need for safety  Insights: Decreased awareness of deficits  Cognition Comment: improved cognition and speech compared to prior session           Plan   Plan  Times per week: 3-5x  Current Treatment Recommendations: Strengthening,Functional Mobility Training,Gait Training,Endurance Training,Balance Training,Safety Education & Training,Self-Care / ADL,Patient/Caregiver Education & Training    AM-PAC Score        AM-[facility] Inpatient Daily Activity Raw Score: 19 (03/18/22 0901)  AM-PAC Inpatient ADL T-Scale Score : 40.22 (03/18/22 0901)  ADL Inpatient CMS 0-100% Score: 42.8 (03/18/22 0901)  ADL Inpatient CMS G-Code Modifier : CK (03/18/22 0901)    Goals  Short term goals  Time Frame for Short term goals: prior to D/C; ongoing 3/18  Short term goal 1: complete functional mobility and transfers Mod Ind  Short term goal 2: complete bathing and dressing Mod Ind  Short term goal 3: complete toileting Mod Ind  Short term goal 4: complete grooming in stance at sink Mod Ind  Long term goals  Time Frame for Long term goals : STG=LTG  Patient Goals Patient goals : return to PLOF       Therapy Time   Individual Concurrent Group Co-treatment   Time In 0816         Time Out 0900         Minutes 44         Timed Code Treatment Minutes: [address], OTR/L

## 2022-03-18 NOTE — CARE COORDINATION
Advance Care Planning     Advance Care Planning Activator (Inpatient)  Conversation Note      Date of ACP Conversation: 3/18/2022     Conversation Conducted with: Patient with Decision Making Capacity    ACP Activator: Avery Mendez RN    Health Care Decision Maker:     Current Designated Health Care Decision Maker:     Primary Decision Maker: Darrin [facility] - [phone]    Secondary Decision Maker: Beatriz Garsia Child - [phone]    Today we documented Decision Maker(s) consistent with Legal Next of Kin hierarchy. Care Preferences    Ventilation: \"If you were in your present state of health and suddenly became very ill and were unable to breathe on your own, what would your preference be about the use of a ventilator (breathing machine) if it were available to you? \"      Would the patient desire the use of ventilator (breathing machine)?: yes    \"If your health worsens and it becomes clear that your chance of recovery is unlikely, what would your preference be about the use of a ventilator (breathing machine) if it were available to you? \"     Would the patient desire the use of ventilator (breathing machine)?: Unsure      Resuscitation  \"CPR works best to restart the heart when there is a sudden event, like a heart attack, in someone who is otherwise healthy. Unfortunately, CPR does not typically restart the heart for people who have serious health conditions or who are very sick. \"    \"In the event your heart stopped as a result of an underlying serious health condition, would you want attempts to be made to restart your heart (answer \"yes\" for attempt to resuscitate) or would you prefer a natural death (answer \"no\" for do not attempt to resuscitate)? \" yes       [] Yes   [x] No   Educated Patient / Ngoc Mallory regarding differences between Advance Directives and portable DNR orders.     Length of ACP Conversation in minutes:  5 minutes    Conversation Outcomes:  [x] ACP discussion completed  [] Existing advance directive reviewed with patient; no changes to patient's previously recorded wishes  [] New Advance Directive completed  [] Portable Do Not Rescitate prepared for Provider review and signature  [] POLST/POST/MOLST/MOST prepared for Provider review and signature      Follow-up plan:    [] Schedule follow-up conversation to continue planning  [] Referred individual to Provider for additional questions/concerns   [] Advised patient/agent/surrogate to review completed ACP document and update if needed with changes in condition, patient preferences or care setting    [x] This note routed to one or more involved healthcare providers  Electronically signed by Yeny Weinstein RN Case Management [phone] on 3/18/2022 at 12:17 PM

## 2022-03-19 LAB
ANION GAP SERPL CALCULATED.3IONS-SCNC: 13 MMOL/L (ref 3–16)
BASOPHILS ABSOLUTE: 0 K/UL (ref 0–0.2)
BASOPHILS RELATIVE PERCENT: 0.4 %
BUN BLDV-MCNC: 16 MG/DL (ref 7–20)
CALCIUM SERPL-MCNC: 8.4 MG/DL (ref 8.3–10.6)
CHLORIDE BLD-SCNC: 98 MMOL/L (ref 99–110)
CO2: 20 MMOL/L (ref 21–32)
CREAT SERPL-MCNC: 0.6 MG/DL (ref 0.6–1.2)
EOSINOPHILS ABSOLUTE: 0.1 K/UL (ref 0–0.6)
EOSINOPHILS RELATIVE PERCENT: 1.8 %
GFR AFRICAN AMERICAN: >60
GFR NON-AFRICAN AMERICAN: >60
GLUCOSE BLD-MCNC: 101 MG/DL (ref 70–99)
HCT VFR BLD CALC: 39.2 % (ref 36–48)
HEMOGLOBIN: 13.1 G/DL (ref 12–16)
LYMPHOCYTES ABSOLUTE: 1.1 K/UL (ref 1–5.1)
LYMPHOCYTES RELATIVE PERCENT: 17.5 %
MCH RBC QN AUTO: 29.7 PG (ref 26–34)
MCHC RBC AUTO-ENTMCNC: 33.5 G/DL (ref 31–36)
MCV RBC AUTO: 88.6 FL (ref 80–100)
MONOCYTES ABSOLUTE: 0.8 K/UL (ref 0–1.3)
MONOCYTES RELATIVE PERCENT: 13 %
NEUTROPHILS ABSOLUTE: 4.4 K/UL (ref 1.7–7.7)
NEUTROPHILS RELATIVE PERCENT: 67.3 %
PDW BLD-RTO: 15.4 % (ref 12.4–15.4)
PLATELET # BLD: 141 K/UL (ref 135–450)
PMV BLD AUTO: 7.6 FL (ref 5–10.5)
POTASSIUM SERPL-SCNC: 3.2 MMOL/L (ref 3.5–5.1)
PROCALCITONIN: 6.88 NG/ML (ref 0–0.15)
RBC # BLD: 4.42 M/UL (ref 4–5.2)
SODIUM BLD-SCNC: 131 MMOL/L (ref 136–145)
URINE CULTURE, ROUTINE: NORMAL
VANCOMYCIN RANDOM: 9.5 UG/ML
WBC # BLD: 6.5 K/UL (ref 4–11)

## 2022-03-19 PROCEDURE — 6360000002 HC RX W HCPCS: Performed by: INTERNAL MEDICINE

## 2022-03-19 PROCEDURE — 6370000000 HC RX 637 (ALT 250 FOR IP): Performed by: NURSE PRACTITIONER

## 2022-03-19 PROCEDURE — 6370000000 HC RX 637 (ALT 250 FOR IP): Performed by: INTERNAL MEDICINE

## 2022-03-19 PROCEDURE — 85025 COMPLETE CBC W/AUTO DIFF WBC: CPT

## 2022-03-19 PROCEDURE — 99232 SBSQ HOSP IP/OBS MODERATE 35: CPT | Performed by: INTERNAL MEDICINE

## 2022-03-19 PROCEDURE — 1200000000 HC SEMI PRIVATE

## 2022-03-19 PROCEDURE — 80202 ASSAY OF VANCOMYCIN: CPT

## 2022-03-19 PROCEDURE — 2580000003 HC RX 258: Performed by: INTERNAL MEDICINE

## 2022-03-19 PROCEDURE — 36415 COLL VENOUS BLD VENIPUNCTURE: CPT

## 2022-03-19 PROCEDURE — 80048 BASIC METABOLIC PNL TOTAL CA: CPT

## 2022-03-19 PROCEDURE — 84145 PROCALCITONIN (PCT): CPT

## 2022-03-19 RX ORDER — POTASSIUM CHLORIDE 20 MEQ/1
40 TABLET, EXTENDED RELEASE ORAL ONCE
Status: COMPLETED | OUTPATIENT
Start: 2022-03-19 | End: 2022-03-19

## 2022-03-19 RX ORDER — ACETAMINOPHEN 500 MG
750 TABLET ORAL EVERY 6 HOURS SCHEDULED
Status: DISCONTINUED | OUTPATIENT
Start: 2022-03-19 | End: 2022-03-24 | Stop reason: HOSPADM

## 2022-03-19 RX ADMIN — Medication 1250 MG: at 09:20

## 2022-03-19 RX ADMIN — ACETAMINOPHEN 750 MG: 500 TABLET ORAL at 17:12

## 2022-03-19 RX ADMIN — POTASSIUM CHLORIDE 40 MEQ: 20 TABLET, EXTENDED RELEASE ORAL at 11:52

## 2022-03-19 RX ADMIN — LOSARTAN POTASSIUM 25 MG: 25 TABLET, FILM COATED ORAL at 09:15

## 2022-03-19 RX ADMIN — FERROUS SULFATE TAB EC 324 MG (65 MG FE EQUIVALENT) 325 MG: 324 (65 FE) TABLET DELAYED RESPONSE at 09:15

## 2022-03-19 RX ADMIN — Medication 1000 UNITS: at 20:46

## 2022-03-19 RX ADMIN — Medication 1000 UNITS: at 17:12

## 2022-03-19 RX ADMIN — CARVEDILOL 12.5 MG: 12.5 TABLET, FILM COATED ORAL at 17:12

## 2022-03-19 RX ADMIN — ACETAMINOPHEN 650 MG: 325 TABLET ORAL at 10:06

## 2022-03-19 RX ADMIN — Medication 1 TABLET: at 09:15

## 2022-03-19 RX ADMIN — CEFTRIAXONE 2000 MG: 2 INJECTION, POWDER, FOR SOLUTION INTRAMUSCULAR; INTRAVENOUS at 17:12

## 2022-03-19 RX ADMIN — ATORVASTATIN CALCIUM 80 MG: 80 TABLET, FILM COATED ORAL at 20:46

## 2022-03-19 RX ADMIN — Medication 1000 UNITS: at 09:15

## 2022-03-19 RX ADMIN — ASPIRIN 81 MG: 81 TABLET, COATED ORAL at 09:16

## 2022-03-19 RX ADMIN — CARVEDILOL 12.5 MG: 12.5 TABLET, FILM COATED ORAL at 09:15

## 2022-03-19 RX ADMIN — BUDESONIDE 3 MG: 3 CAPSULE, GELATIN COATED ORAL at 09:16

## 2022-03-19 RX ADMIN — RIVAROXABAN 15 MG: 15 TABLET, FILM COATED ORAL at 11:52

## 2022-03-19 ASSESSMENT — PAIN DESCRIPTION - PAIN TYPE
TYPE: ACUTE PAIN
TYPE: ACUTE PAIN

## 2022-03-19 ASSESSMENT — PAIN DESCRIPTION - PROGRESSION
CLINICAL_PROGRESSION: GRADUALLY WORSENING
CLINICAL_PROGRESSION: GRADUALLY WORSENING
CLINICAL_PROGRESSION: NOT CHANGED
CLINICAL_PROGRESSION: GRADUALLY WORSENING

## 2022-03-19 ASSESSMENT — PAIN DESCRIPTION - FREQUENCY
FREQUENCY: CONTINUOUS
FREQUENCY: CONTINUOUS

## 2022-03-19 ASSESSMENT — PAIN SCALES - GENERAL
PAINLEVEL_OUTOF10: 3
PAINLEVEL_OUTOF10: 6

## 2022-03-19 ASSESSMENT — PAIN DESCRIPTION - DESCRIPTORS
DESCRIPTORS: ACHING;SHARP
DESCRIPTORS: ACHING

## 2022-03-19 ASSESSMENT — PAIN DESCRIPTION - ONSET
ONSET: ON-GOING
ONSET: ON-GOING

## 2022-03-19 ASSESSMENT — PAIN DESCRIPTION - LOCATION
LOCATION: HEAD
LOCATION: HEAD

## 2022-03-19 ASSESSMENT — PAIN - FUNCTIONAL ASSESSMENT
PAIN_FUNCTIONAL_ASSESSMENT: ACTIVITIES ARE NOT PREVENTED
PAIN_FUNCTIONAL_ASSESSMENT: ACTIVITIES ARE NOT PREVENTED

## 2022-03-19 NOTE — PROGRESS NOTES
Pt's daughter Dougie David at bedside with questions regarding pt's discharge. Previous case management noted stated that pt was declining rehab at the time and that family could provide care. Pt and daughter state that pt wishes to go to acute rehab and family is not able to provide 24/7 care.   Electronically signed by Geni Mayen RN on 3/19/22 at 10:38 AM EDT

## 2022-03-19 NOTE — PROGRESS NOTES
Hospitalist Progress Note      PCP: Sergio Randhawa MD    Date of Admission: 3/16/2022    Chief Complaint: [facility] Course: 79-year-old female presented to the hospital with multiple episodes of confusion/aphasia. Of note patient had intervention performed lacrimal duct of the eye with a stent removal recently and has been on antibiotics which was just stopped on Tuesday couple of days ago. Patient has been febrile while in the hospital.  Will start IV antibiotics and consult ID, blood cultures will be ordered    Subjective: No acute events reported overnight. Headache is all much improved. Denies any further episodes of confusion. Medications:  Reviewed    Infusion Medications   Scheduled Medications    cefTRIAXone (ROCEPHIN) IV  2,000 mg IntraVENous Q24H    acetaminophen  750 mg Oral 4 times per day    rivaroxaban  15 mg Oral Daily with breakfast    budesonide  3 mg Oral QAM    Vitamin D  1,000 Units Oral TID    ferrous sulfate  325 mg Oral Daily with breakfast    therapeutic multivitamin-minerals  1 tablet Oral Daily    atorvastatin  80 mg Oral Nightly    aspirin  81 mg Oral Daily    Or    aspirin  300 mg Rectal Daily    carvedilol  12.5 mg Oral BID WC    losartan  25 mg Oral Daily    vancomycin (VANCOCIN) intermittent dosing (placeholder)   Other RX Placeholder     PRN Meds: cetirizine, calcium carbonate, ondansetron **OR** ondansetron, polyethylene glycol, perflutren lipid microspheres, labetalol      Intake/Output Summary (Last 24 hours) at 3/19/2022 1305  Last data filed at 3/19/2022 0921  Gross per 24 hour   Intake 240 ml   Output    Net 240 ml       Physical Exam Performed:    BP (!) 147/70   Pulse 76   Temp 97.9 °F (36.6 °C) (Oral)   Resp 29   Ht 5' 2\" (1.575 m)   Wt 141 lb 12.1 oz (64.3 kg)   SpO2 98%   BMI 25.93 kg/m²     General appearance: No apparent distress, appears stated age and cooperative.   HEENT: Pupils equal, round, and reactive to light. Conjunctivae/corneas clear. Neck: Supple, with full range of motion. No jugular venous distention. Trachea midline. Respiratory:  Normal respiratory effort. Clear to auscultation, bilaterally without Rales/Wheezes/Rhonchi. Cardiovascular: Regular rate and rhythm with normal S1/S2 without murmurs, rubs or gallops. Abdomen: Soft, non-tender, non-distended with normal bowel sounds. Musculoskeletal: No clubbing, cyanosis or edema bilaterally. Full range of motion without deformity. Skin: Skin color, texture, turgor normal.  No rashes or lesions. Neurologic:  Neurovascularly intact without any focal sensory/motor deficits. Cranial nerves: II-XII intact, grossly non-focal.  Psychiatric: Alert and oriented, thought content appropriate, normal insight  Capillary Refill: Brisk,3 seconds, normal   Peripheral Pulses: +2 palpable, equal bilaterally       Labs:   Recent Labs     03/17/22  0504 03/18/22  0538 03/19/22  0550   WBC 7.7 6.9 6.5   HGB 13.3 13.0 13.1   HCT 40.4 38.3 39.2    140 141     Recent Labs     03/17/22  0504 03/18/22  0538 03/19/22  0550   * 137 131*   K 3.6 3.7 3.2*   CL 99 101 98*   CO2 18* 20* 20*   BUN 14 15 16   CREATININE 0.6 0.7 0.6   CALCIUM 8.7 8.5 8.4     No results for input(s): AST, ALT, BILIDIR, BILITOT, ALKPHOS in the last 72 hours. No results for input(s): INR in the last 72 hours. No results for input(s): Stoney Min in the last 72 hours. Urinalysis:      Lab Results   Component Value Date    NITRU Negative 03/18/2022    WBCUA 4 03/18/2022    RBCUA 13 03/18/2022    BLOODU SMALL 03/18/2022    SPECGRAV 1.019 03/18/2022    GLUCOSEU Negative 03/18/2022       Radiology:  MRI brain without contrast   Final Result   1. No acute intracranial abnormality. No acute infarct. 2. Minimal global parenchymal volume loss with mild chronic microvascular   ischemic changes. 3. Fluid is seen in the right sphenoid sinus.          CT HEAD WO CONTRAST   Final Result 1. No acute intracranial abnormality. 2. Mild cerebral white matter chronic microvascular ischemic disease. 3. Soft tissue volume loss involving the left frontal scalp suggesting   association with scarring. Recommend clinical correlation. Assessment/Plan:    Active Hospital Problems    Diagnosis     Expressive aphasia [R47.01]     Acute cerebrovascular accident (CVA) ([facility] [address] [I63.9]      Acute encephalopathy/aphasia  Patient symptoms have mostly resolved, likely infectious etiology of symptoms  MRI of the brain negative for acute CVA  Neurology following  Continue neurochecks  Continue empiric vancomycin/Rocephin. ID following  Procalcitonin elevated to 13, improved down to 6.8  Await blood cultures    Hypertension  Continue losartan, Coreg    Hypokalemia  Replace and recheck    Atrial fibrillation  Continue Coreg, Xarelto    DVT Prophylaxis: SCD  Diet: ADULT DIET;  Regular  Code Status: Full Code    PT/OT Eval Status: pending    Dispo - pending clinical course    Darell Guzmán MD

## 2022-03-19 NOTE — PLAN OF CARE
Problem: Pain:  Goal: Pain level will decrease  Description: Pain level will decrease  3/19/2022 1107 by Jono Ware RN  Outcome: Ongoing  3/19/2022 0214 by Yoli Ladd RN  Outcome: Ongoing  Goal: Control of acute pain  Description: Control of acute pain  3/19/2022 1107 by Jono Ware RN  Outcome: Ongoing  3/19/2022 0214 by Yoli Ladd RN  Outcome: Ongoing  Goal: Control of chronic pain  Description: Control of chronic pain  3/19/2022 1107 by Jono Ware RN  Outcome: Ongoing  3/19/2022 0214 by Yoli Ladd RN  Outcome: Ongoing     Problem: Falls - Risk of:  Goal: Will remain free from falls  Description: Will remain free from falls  3/19/2022 1107 by Jono Ware RN  Outcome: Ongoing  Note: Fall precautions in place. Bed locked and in lowest position. Call light within reach. Room kept free of clutter.    3/19/2022 0214 by Yoli aLdd RN  Outcome: Ongoing  Goal: Absence of physical injury  Description: Absence of physical injury  3/19/2022 1107 by Jono Ware RN  Outcome: Ongoing  3/19/2022 0214 by Yoli Ladd RN  Outcome: Ongoing

## 2022-03-19 NOTE — PROGRESS NOTES
Infectious Disease Follow up Notes  Admit Date: 3/16/2022  Hospital Day: 4    Antibiotics :   IV Vancomycin   IV Ceftriaxone     CHIEF COMPLAINT:     Fevers  Change in mentation   Left side head aches   Procal elevation    Subjective interval History :  82 y.o. woman with a past medical history of hypertension, chronic anticoagulation, atrial fibrillation admitted to the hospital secondary to left-sided headache, scalp pain, fever, chills, some confusion. Patient was generally feeling very weak and tired and having left-sided headache was seen in [facility] ED, completed CT of the brain as well as CTA of the neck was negative she was given symptomatic treatment. Following this at home daughter noticed that her mother was more confused and weak hence brought her to the hospital for further evaluation. She recently also had a right lacrimal duct stent removal though Nasal endoscocpy on 3/9/21 , per family she was on oral amoxicillin therapy and completed the course recently . IN the hospital now developed fever T-max 102.4, ongoing left-sided scalp pain but no skin lesions. Location : Left side head aches and pain       Quality : aching        Severity : 8/10    Duration : 7 days       Timing : intermittent  Context : None    Modifying factors : none   Associated signs and symptoms: fevers, no cough no sputum no skin lesions no GI or  symptoms. Interval History : sitting up in the chair mentation back to normal no fevers and wbc TREND DOWN      Past Medical History:    Past Medical History:   Diagnosis Date    Atrial fibrillation [facility])        Past Surgical History:    History reviewed. No pertinent surgical history.     Current Medications:    Outpatient Medications Marked as Taking for the 3/16/22 encounter [facility] Encounter)   Medication Sig Dispense Refill    alendronate (FOSAMAX) 70 MG tablet Take 70 mg by mouth every 7 days  atorvastatin (LIPITOR) 20 MG tablet Take 20 mg by mouth daily      budesonide (ENTOCORT EC) 3 MG extended release capsule Take 6 mg by mouth every morning      carvedilol (COREG) 12.5 MG tablet Take 12.5 mg by mouth 2 times daily (with meals)      losartan (COZAAR) 50 MG tablet Take 50 mg by mouth daily       rivaroxaban (XARELTO) 15 MG TABS tablet Take 15 mg by mouth daily (with breakfast)      vitamin D 25 MCG (1000 UT) CAPS Take 1,000 Units by mouth in the morning, at noon, and at bedtime      ferrous sulfate (IRON 325) 325 (65 Fe) MG tablet Take 325 mg by mouth daily (with breakfast)      Cyanocobalamin 5000 MCG TBDP Take 5,000 mcg by mouth every 30 days      cetirizine (ZYRTEC) 10 MG tablet Take 10 mg by mouth daily as needed for Allergies      acetaminophen (TYLENOL) 500 MG tablet Take 750 mg by mouth every 6 hours as needed for Pain      calcium carbonate (TUMS) 500 MG chewable tablet Take 1 tablet by mouth 3 times daily as needed for Heartburn      Multiple Vitamins-Minerals (THERAPEUTIC MULTIVITAMIN-MINERALS) tablet Take 1 tablet by mouth daily         Allergies:  Aspirin and Lisinopril    Immunizations :   Immunization History   Administered Date(s) Administered    COVID-19, Moderna, Booster, PF, 50mcg/0.25ml 11/04/2021    COVID-19, Moderna, Primary or Immunocompromised, PF, 100mcg/0.5mL 01/24/2021, 02/21/2021       Social History:   Social History     Tobacco Use    Smoking status: Not on file    Smokeless tobacco: Not on file   Substance Use Topics    Alcohol use: Not on file    Drug use: Not on file     Social History     Tobacco Use   Smoking Status Not on file   Smokeless Tobacco Not on file      Family History : no DVT no COPD       REVIEW OF SYSTEMS:      Constitutional:    fevers,+  Chills,+  night sweats  Eyes:  negative for blurred vision, eye discharge, visual disturbance   HEENT:  negative for hearing loss, ear drainage,nasal congestion  Respiratory:  negative for cough, shortness of breath or hemoptysis   Cardiovascular:  negative for chest pain, palpitations, syncope  Gastrointestinal:  negative for nausea, vomiting, diarrhea, constipation, abdominal pain  Genitourinary:  negative for frequency, dysuria, urinary incontinence, hematuria  Hematologic/Lymphatic:  negative for easy bruising, bleeding and lymphadenopathy  Allergic/Immunologic:  negative for recurrent infections, angioedema, anaphylaxis   Endocrine:  negative for weight changes, polyuria, polydipsia and polyphagia  Musculoskeletal:  negative for joint  pain, swelling, decreased range of motion  Integumentary: No rashes, skin lesions  Neurological:  negative for headaches, slurred speech, unilateral weakness  Psychiatric: negative for hallucinations,confusion,agitation.                 PHYSICAL EXAM:      Vitals:    BP (!) 147/70   Pulse 76   Temp 97.9 °F (36.6 °C) (Oral)   Resp 29   Ht 5' 2\" (1.575 m)   Wt 141 lb 12.1 oz (64.3 kg)   SpO2 98%   BMI 25.93 kg/m²     General Appearance: alert,in no acute distress, no pallor, no icterus   Skin: warm and dry, no rash or erythema  Head: normocephalic and atraumatic  Eyes: pupils equal, round, and reactive to light, conjunctivae normal  ENT: tympanic membrane, external ear and ear canal normal bilaterally, nose without deformity, nasal mucosa and turbinates normal without polyps  Neck: supple and non-tender without mass, no thyromegaly  no cervical lymphadenopathy  Pulmonary/Chest: clear to auscultation bilaterally- no wheezes, rales or rhonchi, normal air movement, no respiratory distress  Cardiovascular: normal rate, regular rhythm, normal S1 and S2, no murmurs, rubs, clicks, or gallops, no carotid bruits  Abdomen: soft, non-tender, non-distended, normal bowel sounds, no masses or organomegaly  Extremities: no cyanosis, clubbing or edema  Musculoskeletal: normal range of motion, no joint swelling, deformity or tenderness  Integumentary: No rashes, no abnormal skin lesions, no petechiae  Neurologic: reflexes normal and symmetric, no cranial nerve deficit  Psych:  Orientation, sensorium, mood normal            Lines: IV       Data Review:    CBC:   Lab Results   Component Value Date    WBC 6.5 03/19/2022    HGB 13.1 03/19/2022    HCT 39.2 03/19/2022    MCV 88.6 03/19/2022     03/19/2022     RENAL:   Lab Results   Component Value Date    CREATININE 0.6 03/19/2022    BUN 16 03/19/2022     (L) 03/19/2022    K 3.2 (L) 03/19/2022    CL 98 (L) 03/19/2022    CO2 20 (L) 03/19/2022     SED RATE:   Lab Results   Component Value Date    SEDRATE 25 03/18/2022     CK: No results found for: CKTOTAL  CRP:   Lab Results   Component Value Date    CRP <3.0 03/18/2022     Hepatic Function Panel:   Lab Results   Component Value Date    ALKPHOS 60 07/25/2021    ALT 22 07/25/2021    AST 23 07/25/2021    PROT 5.6 07/25/2021    PROT 6.4 12/21/2010    BILITOT 0.9 07/25/2021    LABALBU 3.3 07/25/2021     UA:  Lab Results   Component Value Date    COLORU YELLOW 03/18/2022    CLARITYU Clear 03/18/2022    GLUCOSEU Negative 03/18/2022    BILIRUBINUR Negative 03/18/2022    KETUA 15 03/18/2022    SPECGRAV 1.019 03/18/2022    BLOODU SMALL 03/18/2022    PHUR 6.0 03/18/2022    PROTEINU TRACE 03/18/2022    UROBILINOGEN 0.2 03/18/2022    NITRU Negative 03/18/2022    LEUKOCYTESUR Negative 03/18/2022    LABMICR YES 03/18/2022    URINETYPE NotGiven 03/18/2022      Urine Microscopic:   Lab Results   Component Value Date    HYALCAST 1 03/18/2022    WBCUA 4 03/18/2022    RBCUA 13 03/18/2022    EPIU 1 03/18/2022     Urine Reflex to Culture: No results found for: URRFLXCULT      MICRO: cultures reviewed and updated by me   Blood Culture:   Lab Results   Component Value Date    [facility]  03/17/2022     No Growth to date. Any change in status will be called. BLOODCULT2  03/17/2022     No Growth to date. Any change in status will be called.        Respiratory Culture:  No results found for: CULTRESP, LABGRAM  AFB:No results found for: AFBSMEAR  Viral Culture:  No results found for: COVID19  Urine Culture: No results for input(s): Hay Gins in the last 72 hours. Micro results (current encounter only)    Procedure Component Value Units Date/Time   Respiratory Panel Film Array Report [[id number]] Collected: 03/18/22 0628   Order Status: Completed Updated: 03/18/22 0843    Report SEE IMAGE   Respiratory Panel, Molecular, with COVID-19 (Restricted: peds pts or suitable admitted adults) [[id number]] Collected: 03/18/22 0628   Order Status: Completed Specimen: Respiratory from Nasopharyngeal Updated: 03/18/22 0842    Respiratory Panel PCR --    Respiratory Pathogens Panel PCR Result: Not Detected   See additional report for complete Respiratory Pathogens Panel    Narrative:     ORDER#: [id number]                          ORDERED BY: Felicia Saldivar   SOURCE: Nasopharyngeal                     COLLECTED:  03/18/22 06:28   ANTIBIOTICS AT APRIL. :                      RECEIVED :  03/18/22 06:33   Culture, Urine [[id number]] Collected: 03/18/22 0420   Order Status: Sent Specimen: Urine, clean catch Updated: 03/18/22 0430   Culture, Blood 1 [[id number]] Collected: 03/17/22 1428   Order Status: Sent Specimen: Blood Updated: 03/17/22 1439   Culture, Blood 2 [[id number]] Collected: 03/17/22 1428   Order Status: Sent Specimen: Blood Updated: 03/17/22 1439       IMAGING:    MRI brain without contrast   Final Result   1. No acute intracranial abnormality. No acute infarct. 2. Minimal global parenchymal volume loss with mild chronic microvascular   ischemic changes. 3. Fluid is seen in the right sphenoid sinus. CT HEAD WO CONTRAST   Final Result   1. No acute intracranial abnormality. 2. Mild cerebral white matter chronic microvascular ischemic disease. 3. Soft tissue volume loss involving the left frontal scalp suggesting   association with scarring. Recommend clinical correlation.                All the pertinent images and reports for the current Hospitalization were reviewed by me     Scheduled Meds:   cefTRIAXone (ROCEPHIN) IV  2,000 mg IntraVENous Q24H    acetaminophen  750 mg Oral 4 times per day    potassium chloride  40 mEq Oral Once    rivaroxaban  15 mg Oral Daily with breakfast    budesonide  3 mg Oral QAM    Vitamin D  1,000 Units Oral TID    ferrous sulfate  325 mg Oral Daily with breakfast    therapeutic multivitamin-minerals  1 tablet Oral Daily    atorvastatin  80 mg Oral Nightly    aspirin  81 mg Oral Daily    Or    aspirin  300 mg Rectal Daily    carvedilol  12.5 mg Oral BID WC    losartan  25 mg Oral Daily    vancomycin (VANCOCIN) intermittent dosing (placeholder)   Other RX Placeholder       Continuous Infusions:      PRN Meds:  cetirizine, calcium carbonate, ondansetron **OR** ondansetron, polyethylene glycol, perflutren lipid microspheres, labetalol      Assessment:     Patient Active Problem List   Diagnosis    Expressive aphasia    Acute cerebrovascular accident (CVA) ([facility] [address]     Fevers chills  Left side head aches/scalp pain   Confusion now resolved  WBC normal  MRI brain  -ve  CT head -ve  Recent eval as out patient CTA neck -ve  Afib on chronic anticoagulation   Recent Rt Lacrimal duct stent removal on 3/9  Per note there was crusting and purulence was placed on oral abx course         Clinical exam is negative for any focus and no skin lesions on the scalp checking for HSV OR vzv, and MRI brain some fluid in the Rt sphenoid sinus but her symptoms or on the Left side of the scalp      Given negative exam and normal WBC bacterial infection low on the DDx but Procal came back very elevated and in light of fevers and Lab test may cont IV abx for now      Fevers trend down and Procal improving  but clinically better able to d/c IV abx soon in 48 hrs would be ok for rehab placement when ok with primary          Labs, Microbiology, Radiology and all the pertinent results from current hospitalization and  care every where were reviewed  by me as a part of the evaluation   Plan:   1. Check Procal repeat to trend   2. COVID 19 and Resp virus PCR -VE  3. Blood cx IN PROCESS NGTD   4. UA and Urine cx -VE  5. MRI brain -ve  6. Cont IV Ceftriaxone x 2 gm Q 24 hrs  and IV Vancomycin pending Blood cx   7 Care every where records reviewed  8. WILL be able to d/c IV abx soon          Discussed with patient/Family and Nursing d/w daughter at bed side       Discussed with patient/Family and Nursing staff     Thanks for allowing me to participate in your patient's care and please call me with any questions or concerns.     José Manuel Wilson MD  Infectious Disease  [facility] ([facility]) Physician  Phone: [phone]   Fax : [phone]

## 2022-03-19 NOTE — CONSULTS
Clinical Pharmacy Note  Vancomycin Consult    Elina Zamora is a 82 y.o. female ordered Vancomycin for sepsis of unknown origin; consult received from Dr. Eloise Day to manage therapy. Also receiving ceftriaxone. Patient Active Problem List   Diagnosis    Expressive aphasia    Acute cerebrovascular accident (CVA) ([address]       Allergies:  Aspirin and Lisinopril     Temp max:  Temp (24hrs), Av.1 °F (36.7 °C), Min:97.5 °F (36.4 °C), Max:99.7 °F (37.6 °C)      Recent Labs     22  0504 22  0538 22  0550   WBC 7.7 6.9 6.5       Recent Labs     22  0504 22  0538 22  0550   BUN 14 15 16   CREATININE 0.6 0.7 0.6       No intake or output data in the 24 hours ending 22 0836    Culture Results:  pending    Ht Readings from Last 1 Encounters:   22 5' 2\" (1.575 m)        Wt Readings from Last 1 Encounters:   22 141 lb 12.1 oz (64.3 kg)         Estimated Creatinine Clearance: 60 mL/min (based on SCr of 0.6 mg/dL). Assessment/Plan:    Vanco day # 3  Received vanco 1250 mg x 1 yesterday  Random level - 9.5 mg/L this am  Will continue to dose intermittently based on levels     Vanco 1250 mg x 1 today  Random vanco level tomorrow am    Thank you for the consult.      Electronically signed by Mary Xiao [facility] on 3/19/2022 at 8:36 AM

## 2022-03-20 LAB
ANION GAP SERPL CALCULATED.3IONS-SCNC: 13 MMOL/L (ref 3–16)
BASOPHILS ABSOLUTE: 0 K/UL (ref 0–0.2)
BASOPHILS RELATIVE PERCENT: 0.5 %
BUN BLDV-MCNC: 27 MG/DL (ref 7–20)
CALCIUM SERPL-MCNC: 8.7 MG/DL (ref 8.3–10.6)
CHLORIDE BLD-SCNC: 99 MMOL/L (ref 99–110)
CO2: 20 MMOL/L (ref 21–32)
CREAT SERPL-MCNC: 1.8 MG/DL (ref 0.6–1.2)
EOSINOPHILS ABSOLUTE: 0.2 K/UL (ref 0–0.6)
EOSINOPHILS RELATIVE PERCENT: 2.6 %
GFR AFRICAN AMERICAN: 32
GFR NON-AFRICAN AMERICAN: 27
GLUCOSE BLD-MCNC: 104 MG/DL (ref 70–99)
HCT VFR BLD CALC: 38.1 % (ref 36–48)
HEMOGLOBIN: 12.9 G/DL (ref 12–16)
LYMPHOCYTES ABSOLUTE: 0.7 K/UL (ref 1–5.1)
LYMPHOCYTES RELATIVE PERCENT: 11 %
MCH RBC QN AUTO: 29.9 PG (ref 26–34)
MCHC RBC AUTO-ENTMCNC: 33.7 G/DL (ref 31–36)
MCV RBC AUTO: 88.6 FL (ref 80–100)
MONOCYTES ABSOLUTE: 0.7 K/UL (ref 0–1.3)
MONOCYTES RELATIVE PERCENT: 11.4 %
NEUTROPHILS ABSOLUTE: 4.8 K/UL (ref 1.7–7.7)
NEUTROPHILS RELATIVE PERCENT: 74.5 %
PDW BLD-RTO: 15.7 % (ref 12.4–15.4)
PLATELET # BLD: 139 K/UL (ref 135–450)
PMV BLD AUTO: 7.7 FL (ref 5–10.5)
POTASSIUM SERPL-SCNC: 3.5 MMOL/L (ref 3.5–5.1)
PROCALCITONIN: 7.1 NG/ML (ref 0–0.15)
RBC # BLD: 4.3 M/UL (ref 4–5.2)
SODIUM BLD-SCNC: 132 MMOL/L (ref 136–145)
VANCOMYCIN RANDOM: 18.9 UG/ML
WBC # BLD: 6.5 K/UL (ref 4–11)

## 2022-03-20 PROCEDURE — 6370000000 HC RX 637 (ALT 250 FOR IP): Performed by: INTERNAL MEDICINE

## 2022-03-20 PROCEDURE — 6370000000 HC RX 637 (ALT 250 FOR IP): Performed by: NURSE PRACTITIONER

## 2022-03-20 PROCEDURE — 36415 COLL VENOUS BLD VENIPUNCTURE: CPT

## 2022-03-20 PROCEDURE — 84145 PROCALCITONIN (PCT): CPT

## 2022-03-20 PROCEDURE — 99232 SBSQ HOSP IP/OBS MODERATE 35: CPT | Performed by: INTERNAL MEDICINE

## 2022-03-20 PROCEDURE — 85025 COMPLETE CBC W/AUTO DIFF WBC: CPT

## 2022-03-20 PROCEDURE — 1200000000 HC SEMI PRIVATE

## 2022-03-20 PROCEDURE — 80048 BASIC METABOLIC PNL TOTAL CA: CPT

## 2022-03-20 PROCEDURE — 2580000003 HC RX 258: Performed by: HOSPITALIST

## 2022-03-20 PROCEDURE — 80202 ASSAY OF VANCOMYCIN: CPT

## 2022-03-20 RX ORDER — SODIUM CHLORIDE 9 MG/ML
INJECTION, SOLUTION INTRAVENOUS CONTINUOUS
Status: DISCONTINUED | OUTPATIENT
Start: 2022-03-20 | End: 2022-03-20

## 2022-03-20 RX ORDER — CEFDINIR 300 MG/1
300 CAPSULE ORAL EVERY 12 HOURS SCHEDULED
Status: COMPLETED | OUTPATIENT
Start: 2022-03-20 | End: 2022-03-23

## 2022-03-20 RX ADMIN — SODIUM CHLORIDE: 9 INJECTION, SOLUTION INTRAVENOUS at 10:35

## 2022-03-20 RX ADMIN — CARVEDILOL 12.5 MG: 12.5 TABLET, FILM COATED ORAL at 09:42

## 2022-03-20 RX ADMIN — ATORVASTATIN CALCIUM 80 MG: 80 TABLET, FILM COATED ORAL at 20:46

## 2022-03-20 RX ADMIN — ACETAMINOPHEN 750 MG: 500 TABLET ORAL at 06:11

## 2022-03-20 RX ADMIN — ASPIRIN 81 MG: 81 TABLET, COATED ORAL at 09:42

## 2022-03-20 RX ADMIN — Medication 1000 UNITS: at 09:42

## 2022-03-20 RX ADMIN — FERROUS SULFATE TAB EC 324 MG (65 MG FE EQUIVALENT) 325 MG: 324 (65 FE) TABLET DELAYED RESPONSE at 09:42

## 2022-03-20 RX ADMIN — Medication 1 TABLET: at 09:42

## 2022-03-20 RX ADMIN — ACETAMINOPHEN 750 MG: 500 TABLET ORAL at 18:02

## 2022-03-20 RX ADMIN — BUDESONIDE 3 MG: 3 CAPSULE, GELATIN COATED ORAL at 09:58

## 2022-03-20 RX ADMIN — CARVEDILOL 12.5 MG: 12.5 TABLET, FILM COATED ORAL at 18:02

## 2022-03-20 RX ADMIN — ACETAMINOPHEN 750 MG: 500 TABLET ORAL at 23:13

## 2022-03-20 RX ADMIN — ACETAMINOPHEN 750 MG: 500 TABLET ORAL at 12:26

## 2022-03-20 RX ADMIN — RIVAROXABAN 15 MG: 15 TABLET, FILM COATED ORAL at 09:43

## 2022-03-20 RX ADMIN — Medication 1000 UNITS: at 20:46

## 2022-03-20 RX ADMIN — ACETAMINOPHEN 750 MG: 500 TABLET ORAL at 00:41

## 2022-03-20 RX ADMIN — Medication 1000 UNITS: at 18:02

## 2022-03-20 RX ADMIN — CEFDINIR 300 MG: 300 CAPSULE ORAL at 20:46

## 2022-03-20 ASSESSMENT — PAIN SCALES - GENERAL
PAINLEVEL_OUTOF10: 3
PAINLEVEL_OUTOF10: 3
PAINLEVEL_OUTOF10: 0
PAINLEVEL_OUTOF10: 2

## 2022-03-20 NOTE — PLAN OF CARE
Problem: Pain:  Goal: Pain level will decrease  Description: Pain level will decrease  3/20/2022 0100 by Jeff Gerardo RN  Outcome: Ongoing  3/19/2022 1107 by Manuela Levine RN  Outcome: Ongoing     Problem: Falls - Risk of:  Goal: Will remain free from falls  Description: Will remain free from falls  3/20/2022 0100 by Jeff Gerardo RN  Outcome: Ongoing  3/19/2022 1107 by Manuela Levine RN  Outcome: Ongoing  Note: Fall precautions in place. Bed locked and in lowest position. Call light within reach. Room kept free of clutter.       Problem: Skin Integrity:  Goal: Will show no infection signs and symptoms  Description: Will show no infection signs and symptoms  Outcome: Ongoing

## 2022-03-20 NOTE — PROGRESS NOTES
Infectious Disease Follow up Notes  Admit Date: 3/16/2022  Hospital Day: 5    Antibiotics :   IV Vancomycin stopped  Oral Omnicef      CHIEF COMPLAINT:     Fevers  Change in mentation   Left side head aches   Procal elevation  UCHE     Subjective interval History :  82 y.o. woman with a past medical history of hypertension, chronic anticoagulation, atrial fibrillation admitted to the hospital secondary to left-sided headache, scalp pain, fever, chills, some confusion. Patient was generally feeling very weak and tired and having left-sided headache was seen in [facility] ED, completed CT of the brain as well as CTA of the neck was negative she was given symptomatic treatment. Following this at home daughter noticed that her mother was more confused and weak hence brought her to the hospital for further evaluation. She recently also had a right lacrimal duct stent removal though Nasal endoscocpy on 3/9/21 , per family she was on oral amoxicillin therapy and completed the course recently . IN the hospital now developed fever T-max 102.4, ongoing left-sided scalp pain but no skin lesions. Location : Left side head aches and pain       Quality : aching        Severity : 8/10    Duration : 7 days       Timing : intermittent  Context : None    Modifying factors : none   Associated signs and symptoms: fevers, no cough no sputum no skin lesions no GI or  symptoms. Interval History : sitting up in the chair  NO fevers no chills and head aches better and  No nausea, no vomiting  - creat noted to be elevated    Past Medical History:    Past Medical History:   Diagnosis Date    Atrial fibrillation [facility])        Past Surgical History:    History reviewed. No pertinent surgical history.     Current Medications:    Outpatient Medications Marked as Taking for the 3/16/22 encounter [facility] Encounter)   Medication Sig Dispense Refill    alendronate (FOSAMAX) 70 MG tablet Take 70 mg by mouth every 7 days      atorvastatin (LIPITOR) 20 MG tablet Take 20 mg by mouth daily      budesonide (ENTOCORT EC) 3 MG extended release capsule Take 6 mg by mouth every morning      carvedilol (COREG) 12.5 MG tablet Take 12.5 mg by mouth 2 times daily (with meals)      losartan (COZAAR) 50 MG tablet Take 50 mg by mouth daily       rivaroxaban (XARELTO) 15 MG TABS tablet Take 15 mg by mouth daily (with breakfast)      vitamin D 25 MCG (1000 UT) CAPS Take 1,000 Units by mouth in the morning, at noon, and at bedtime      ferrous sulfate (IRON 325) 325 (65 Fe) MG tablet Take 325 mg by mouth daily (with breakfast)      Cyanocobalamin 5000 MCG TBDP Take 5,000 mcg by mouth every 30 days      cetirizine (ZYRTEC) 10 MG tablet Take 10 mg by mouth daily as needed for Allergies      acetaminophen (TYLENOL) 500 MG tablet Take 750 mg by mouth every 6 hours as needed for Pain      calcium carbonate (TUMS) 500 MG chewable tablet Take 1 tablet by mouth 3 times daily as needed for Heartburn      Multiple Vitamins-Minerals (THERAPEUTIC MULTIVITAMIN-MINERALS) tablet Take 1 tablet by mouth daily         Allergies:  Aspirin and Lisinopril    Immunizations :   Immunization History   Administered Date(s) Administered    COVID-19, Moderna, Booster, PF, 50mcg/0.25ml 11/04/2021    COVID-19, Moderna, Primary or Immunocompromised, PF, 100mcg/0.5mL 01/24/2021, 02/21/2021       Social History:   Social History     Tobacco Use    Smoking status: Never Smoker    Smokeless tobacco: Never Used   Vaping Use    Vaping Use: Never used   Substance Use Topics    Alcohol use: Never    Drug use: Never     Social History     Tobacco Use   Smoking Status Never Smoker   Smokeless Tobacco Never Used      Family History : no DVT no COPD       REVIEW OF SYSTEMS:      Constitutional:    fevers,+  Chills,+  night sweats  Eyes:  negative for blurred vision, eye discharge, visual disturbance HEENT:  negative for hearing loss, ear drainage,nasal congestion  Respiratory:  negative for cough, shortness of breath or hemoptysis   Cardiovascular:  negative for chest pain, palpitations, syncope  Gastrointestinal:  negative for nausea, vomiting, diarrhea, constipation, abdominal pain  Genitourinary:  negative for frequency, dysuria, urinary incontinence, hematuria  Hematologic/Lymphatic:  negative for easy bruising, bleeding and lymphadenopathy  Allergic/Immunologic:  negative for recurrent infections, angioedema, anaphylaxis   Endocrine:  negative for weight changes, polyuria, polydipsia and polyphagia  Musculoskeletal:  negative for joint  pain, swelling, decreased range of motion  Integumentary: No rashes, skin lesions  Neurological:  negative for headaches, slurred speech, unilateral weakness  Psychiatric: negative for hallucinations,confusion,agitation.                 PHYSICAL EXAM:      Vitals:    /62   Pulse 71   Temp 98.2 °F (36.8 °C) (Oral)   Resp 21   Ht 5' 2\" (1.575 m)   Wt 136 lb 14.5 oz (62.1 kg)   SpO2 94%   BMI 25.04 kg/m²     General Appearance: alert,in no acute distress, no pallor, no icterus   Skin: warm and dry, no rash or erythema  Head: normocephalic and atraumatic  Eyes: pupils equal, round, and reactive to light, conjunctivae normal  ENT: tympanic membrane, external ear and ear canal normal bilaterally, nose without deformity, nasal mucosa and turbinates normal without polyps  Neck: supple and non-tender without mass, no thyromegaly  no cervical lymphadenopathy  Pulmonary/Chest: clear to auscultation bilaterally- no wheezes, rales or rhonchi, normal air movement, no respiratory distress  Cardiovascular: normal rate, regular rhythm, normal S1 and S2, no murmurs, rubs, clicks, or gallops, no carotid bruits  Abdomen: soft, non-tender, non-distended, normal bowel sounds, no masses or organomegaly  Extremities: no cyanosis, clubbing or edema  Musculoskeletal: normal range of motion, no joint swelling, deformity or tenderness  Integumentary: No rashes, no abnormal skin lesions, no petechiae  Neurologic: reflexes normal and symmetric, no cranial nerve deficit  Psych:  Orientation, sensorium, mood normal            Lines: IV       Data Review:    CBC:   Lab Results   Component Value Date    WBC 6.5 03/20/2022    HGB 12.9 03/20/2022    HCT 38.1 03/20/2022    MCV 88.6 03/20/2022     03/20/2022     RENAL:   Lab Results   Component Value Date    CREATININE 1.8 (H) 03/20/2022    BUN 27 (H) 03/20/2022     (L) 03/20/2022    K 3.5 03/20/2022    CL 99 03/20/2022    CO2 20 (L) 03/20/2022     SED RATE:   Lab Results   Component Value Date    SEDRATE 25 03/18/2022     CK: No results found for: CKTOTAL  CRP:   Lab Results   Component Value Date    CRP <3.0 03/18/2022     Hepatic Function Panel:   Lab Results   Component Value Date    ALKPHOS 60 07/25/2021    ALT 22 07/25/2021    AST 23 07/25/2021    PROT 5.6 07/25/2021    PROT 6.4 12/21/2010    BILITOT 0.9 07/25/2021    LABALBU 3.3 07/25/2021     UA:  Lab Results   Component Value Date    COLORU YELLOW 03/18/2022    CLARITYU Clear 03/18/2022    GLUCOSEU Negative 03/18/2022    BILIRUBINUR Negative 03/18/2022    KETUA 15 03/18/2022    SPECGRAV 1.019 03/18/2022    BLOODU SMALL 03/18/2022    PHUR 6.0 03/18/2022    PROTEINU TRACE 03/18/2022    UROBILINOGEN 0.2 03/18/2022    NITRU Negative 03/18/2022    LEUKOCYTESUR Negative 03/18/2022    LABMICR YES 03/18/2022    URINETYPE NotGiven 03/18/2022      Urine Microscopic:   Lab Results   Component Value Date    HYALCAST 1 03/18/2022    WBCUA 4 03/18/2022    RBCUA 13 03/18/2022    EPIU 1 03/18/2022     Urine Reflex to Culture: No results found for: URRFLXCULT      MICRO: cultures reviewed and updated by me   Blood Culture:   Lab Results   Component Value Date    [facility]  03/17/2022     No Growth to date. Any change in status will be called. BLOODCULT2  03/17/2022     No Growth to date.   Any change in status will be called. Respiratory Culture:  No results found for: Tanesha Quiñonez  AFB:No results found for: AFBSMEAR  Viral Culture:  No results found for: COVID19  Urine Culture:   Recent Labs     03/18/22 0420   LABURIN No growth at 18 to 36 hours              Micro results (current encounter only)    Procedure Component Value Units Date/Time   Respiratory Panel Film Array Report [[id number]] Collected: 03/18/22 0628   Order Status: Completed Updated: 03/18/22 0843    Report SEE IMAGE   Respiratory Panel, Molecular, with COVID-19 (Restricted: peds pts or suitable admitted adults) [[id number]] Collected: 03/18/22 0628   Order Status: Completed Specimen: Respiratory from Nasopharyngeal Updated: 03/18/22 0842    Respiratory Panel PCR --    Respiratory Pathogens Panel PCR Result: Not Detected   See additional report for complete Respiratory Pathogens Panel    Narrative:     ORDER#: [id number]                          ORDERED BY: Tab Martinez   SOURCE: Nasopharyngeal                     COLLECTED:  03/18/22 06:28   ANTIBIOTICS AT APRIL. :                      RECEIVED :  03/18/22 06:33   Culture, Urine [[id number]] Collected: 03/18/22 0420   Order Status: Sent Specimen: Urine, clean catch Updated: 03/18/22 0430   Culture, Blood 1 [[id number]] Collected: 03/17/22 1428   Order Status: Sent Specimen: Blood Updated: 03/17/22 1439   Culture, Blood 2 [[id number]] Collected: 03/17/22 1428   Order Status: Sent Specimen: Blood Updated: 03/17/22 1439       IMAGING:    MRI brain without contrast   Final Result   1. No acute intracranial abnormality. No acute infarct. 2. Minimal global parenchymal volume loss with mild chronic microvascular   ischemic changes. 3. Fluid is seen in the right sphenoid sinus. CT HEAD WO CONTRAST   Final Result   1. No acute intracranial abnormality. 2. Mild cerebral white matter chronic microvascular ischemic disease.    3. Soft tissue volume loss involving the left frontal scalp suggesting   association with scarring. Recommend clinical correlation.                All the pertinent images and reports for the current Hospitalization were reviewed by me     Scheduled Meds:   cefTRIAXone (ROCEPHIN) IV  2,000 mg IntraVENous Q24H    acetaminophen  750 mg Oral 4 times per day    rivaroxaban  15 mg Oral Daily with breakfast    budesonide  3 mg Oral QAM    Vitamin D  1,000 Units Oral TID    ferrous sulfate  325 mg Oral Daily with breakfast    therapeutic multivitamin-minerals  1 tablet Oral Daily    atorvastatin  80 mg Oral Nightly    aspirin  81 mg Oral Daily    Or    aspirin  300 mg Rectal Daily    carvedilol  12.5 mg Oral BID WC    vancomycin (VANCOCIN) intermittent dosing (placeholder)   Other RX Placeholder       Continuous Infusions:   sodium chloride 75 mL/hr at 03/20/22 1035       PRN Meds:  cetirizine, calcium carbonate, ondansetron **OR** ondansetron, polyethylene glycol, perflutren lipid microspheres, labetalol      Assessment:     Patient Active Problem List   Diagnosis    Expressive aphasia    Acute cerebrovascular accident (CVA) ([facility] [address]     Fevers chills resolving   Left side head aches/scalp pain   Confusion now resolved  WBC normal  MRI brain  -ve  CT head -ve  Recent eval as out patient CTA neck -ve  Afib on chronic anticoagulation   Recent Rt Lacrimal duct stent removal on 3/9  Per note there was crusting and purulence was placed on oral abx course         Clinical exam is negative for any focus and no skin lesions on the scalp checking for HSV OR vzv, and MRI brain some fluid in the Rt sphenoid sinus but her symptoms or on the Left side of the scalp       normal WBC but Procal every elevated and now down trend concern for Bacterial process hence continuing abx will change to oral given UCHE     Fevers trend down and Procal improving CREAT from this AM elevated up to 1.8 - vancomycin level in therapeutic range and will d.c IV Vancomycin and check BMP and Level tomorrow        Labs, Microbiology, Radiology and all the pertinent results from current hospitalization and  care every where were reviewed  by me as a part of the evaluation   Plan:   1. Check Procal repeat to trend   2. COVID 19 and Resp virus PCR -VE  3. Blood cx IN PROCESS NGTD   4. UA and Urine cx -VE  5. MRI brain -ve  6. D/C iv  Ceftriaxone   - change to oral Omnicef   7. D/C IV Vancomycin  8. Check BMP and Vancomycin level        Discussed with patient/Family and Nursing d/w daughter at bed side       Discussed with patient/Family and Nursing staff     Thanks for allowing me to participate in your patient's care and please call me with any questions or concerns.     Kenny Mo MD  Infectious Disease  [facility] ([facility] Physician  Phone: [phone]   Fax : [phone]

## 2022-03-20 NOTE — PROGRESS NOTES
Pt bladder scanned per MD order- 875 ml. MD notified. Order received to place sauceda. Sauceda placed with an immediate return of 950. Sauceda clamped at this time.    Electronically signed by Corazon Leiva RN on 3/20/22 at 6:04 PM EDT    \

## 2022-03-20 NOTE — PLAN OF CARE
Problem: Pain:  Goal: Pain level will decrease  Description: Pain level will decrease  3/20/2022 1115 by Williams Castellanos RN  Outcome: Ongoing  3/20/2022 0100 by Augusta Mi RN  Outcome: Ongoing  Goal: Control of acute pain  Description: Control of acute pain  3/20/2022 1115 by Williams Castellanos RN  Outcome: Ongoing  3/20/2022 0100 by Augusta Mi RN  Outcome: Ongoing  Goal: Control of chronic pain  Description: Control of chronic pain  3/20/2022 1115 by Williams Castellanos RN  Outcome: Ongoing  3/20/2022 0100 by Augusta Mi RN  Outcome: Ongoing     Problem: Falls - Risk of:  Goal: Will remain free from falls  Description: Will remain free from falls  3/20/2022 1115 by Williams Castellanos RN  Outcome: Ongoing  Note: Fall precautions in place. Bed locked and in lowest position. Alarm on. Call light within reach.    3/20/2022 0100 by Augusta Mi RN  Outcome: Ongoing  Goal: Absence of physical injury  Description: Absence of physical injury  3/20/2022 1115 by Williams Castellanos RN  Outcome: Ongoing  3/20/2022 0100 by Augusta Mi RN  Outcome: Ongoing     Problem: Skin Integrity:  Goal: Will show no infection signs and symptoms  Description: Will show no infection signs and symptoms  3/20/2022 1115 by Williams Castellanos RN  Outcome: Ongoing  3/20/2022 0100 by Augusta Mi RN  Outcome: Ongoing  Goal: Absence of new skin breakdown  Description: Absence of new skin breakdown  3/20/2022 1115 by Williams Castellanos RN  Outcome: Ongoing  3/20/2022 0100 by Augusta Mi RN  Outcome: Ongoing

## 2022-03-20 NOTE — PROGRESS NOTES
Hospitalist Progress Note      PCP: Emeli Bryson MD    Date of Admission: 3/16/2022        Subjective:     has a mild Lt occipital headache this morning. . No fevers or chills      Medications:  Reviewed    Infusion Medications   Scheduled Medications    cefTRIAXone (ROCEPHIN) IV  2,000 mg IntraVENous Q24H    acetaminophen  750 mg Oral 4 times per day    rivaroxaban  15 mg Oral Daily with breakfast    budesonide  3 mg Oral QAM    Vitamin D  1,000 Units Oral TID    ferrous sulfate  325 mg Oral Daily with breakfast    therapeutic multivitamin-minerals  1 tablet Oral Daily    atorvastatin  80 mg Oral Nightly    aspirin  81 mg Oral Daily    Or    aspirin  300 mg Rectal Daily    carvedilol  12.5 mg Oral BID WC    losartan  25 mg Oral Daily    vancomycin (VANCOCIN) intermittent dosing (placeholder)   Other RX Placeholder     PRN Meds: cetirizine, calcium carbonate, ondansetron **OR** ondansetron, polyethylene glycol, perflutren lipid microspheres, labetalol    No intake or output data in the 24 hours ending 03/20/22 0939    Physical Exam Performed:    /62   Pulse 71   Temp 98.2 °F (36.8 °C) (Oral)   Resp 21   Ht 5' 2\" (1.575 m)   Wt 136 lb 14.5 oz (62.1 kg)   SpO2 94%   BMI 25.04 kg/m²     General appearance: No apparent distress, appears stated age and cooperative. HEENT: Pupils equal, round, and reactive to light. Conjunctivae/corneas clear. Neck: Supple, with full range of motion. No jugular venous distention. Trachea midline. Respiratory:  Normal respiratory effort. Clear to auscultation, bilaterally without Rales/Wheezes/Rhonchi. Cardiovascular: Regular rate and rhythm with normal S1/S2 without murmurs, rubs or gallops. Abdomen: Soft, non-tender, non-distended with normal bowel sounds. Musculoskeletal: No clubbing, cyanosis or edema bilaterally. Full range of motion without deformity. Skin: Skin color, texture, turgor normal.  No rashes or lesions.   Neurologic: Neurovascularly intact without any focal sensory/motor deficits. Cranial nerves: II-XII intact, grossly non-focal.  Psychiatric: Alert and oriented, thought content appropriate, normal insight  Capillary Refill: Brisk,3 seconds, normal   Peripheral Pulses: +2 palpable, equal bilaterally       Labs:   Recent Labs     03/18/22  0538 03/19/22  0550 03/20/22  0537   WBC 6.9 6.5 6.5   HGB 13.0 13.1 12.9   HCT 38.3 39.2 38.1    141 139     Recent Labs     03/18/22  0538 03/19/22  0550 03/20/22  0537    131* 132*   K 3.7 3.2* 3.5    98* 99   CO2 20* 20* 20*   BUN 15 16 27*   CREATININE 0.7 0.6 1.8*   CALCIUM 8.5 8.4 8.7     No results for input(s): AST, ALT, BILIDIR, BILITOT, ALKPHOS in the last 72 hours. No results for input(s): INR in the last 72 hours. No results for input(s): Erle Keepers in the last 72 hours. Urinalysis:      Lab Results   Component Value Date    NITRU Negative 03/18/2022    WBCUA 4 03/18/2022    RBCUA 13 03/18/2022    BLOODU SMALL 03/18/2022    SPECGRAV 1.019 03/18/2022    GLUCOSEU Negative 03/18/2022       Radiology:  MRI brain without contrast   Final Result   1. No acute intracranial abnormality. No acute infarct. 2. Minimal global parenchymal volume loss with mild chronic microvascular   ischemic changes. 3. Fluid is seen in the right sphenoid sinus. CT HEAD WO CONTRAST   Final Result   1. No acute intracranial abnormality. 2. Mild cerebral white matter chronic microvascular ischemic disease. 3. Soft tissue volume loss involving the left frontal scalp suggesting   association with scarring. Recommend clinical correlation. Assessment/Plan:      Acute encephalopathy/aphasia  -resolved. MRI neg CVA. Possible TIA on presentation. Encephalopathy likely due to infection. . Neuro consult appreciated. . Continue Xarelto and statin    Acute febrile illness. . Unclear source. Fevers resolved.  Procalcitonin elevated but trending down- concerning for bacterial infection blood cultures negative  Continue empiric vancomycin/Rocephin. Follow-up with ID    Left occipital headache--continue analgesics. . No clinical evidence of meningitis     HypertensionContinue Coreg, discontinue losartan due to UCHE    Acute kidney injury-likely prerenal-creatinine increased from 0.6-1.8 within 24 hours. She had recent CT angiogram 3/15 at [facility].. Discontinue losartan, start IV fluids. . Obtain bladder scan r/o retention. . Closely monitor renal function--if no improvement, will obtain renal ultrasound, urine studies and consult nephrology    Hyponatremia--start normal saline infusion     Hypokalemia-repleted       Paroxysmal atrial fibrillationContinue Coreg, Xarelto    Hyperlipidemiacontinue statin    DVT Prophylaxis: Xarelto  Diet: ADULT DIET; Regular  Code Status: Full Code    Disposition. ..  Potential discharge in 24 hours if cleared by MILAN Kumari MD

## 2022-03-20 NOTE — PROGRESS NOTES
Pt's AC IV noted to be red and tender. US IV inserted by JER Stewart. Fluids started per MD order at 1030. Pt called at this time stating her arm was painful and swollen. RN noted inner RFA above IV was extremely swollen and tender to touch. Fluids stopped and IV removed. Pt is currently declining another IV. Perfect serve to Dr. Moises Steve. Per Dr. Moises Steve discuss changing Atb to oral with Dr. Tenzin Rose. Perfect serve to Dr. Tenzin Rose. Atb's changed to oral. Pt in agreement to drink 12oz of water every 2 hrs in place of IV fluids. MD aware that pt does not currently have an IV.    Electronically signed by Penny Carranza RN on 3/20/22 at 1:43 PM EDT

## 2022-03-21 LAB
ALBUMIN SERPL-MCNC: 3.6 G/DL (ref 3.4–5)
ALP BLD-CCNC: 81 U/L (ref 40–129)
ALT SERPL-CCNC: 20 U/L (ref 10–40)
AMMONIA: 18 UMOL/L (ref 11–51)
ANION GAP SERPL CALCULATED.3IONS-SCNC: 12 MMOL/L (ref 3–16)
AST SERPL-CCNC: 30 U/L (ref 15–37)
BASOPHILS ABSOLUTE: 0 K/UL (ref 0–0.2)
BASOPHILS RELATIVE PERCENT: 0.3 %
BILIRUB SERPL-MCNC: 0.4 MG/DL (ref 0–1)
BILIRUBIN DIRECT: <0.2 MG/DL (ref 0–0.3)
BILIRUBIN, INDIRECT: ABNORMAL MG/DL (ref 0–1)
BLOOD CULTURE, ROUTINE: NORMAL
BUN BLDV-MCNC: 21 MG/DL (ref 7–20)
CALCIUM SERPL-MCNC: 8.9 MG/DL (ref 8.3–10.6)
CHLORIDE BLD-SCNC: 103 MMOL/L (ref 99–110)
CO2: 22 MMOL/L (ref 21–32)
CREAT SERPL-MCNC: 1.4 MG/DL (ref 0.6–1.2)
CULTURE, BLOOD 2: NORMAL
EOSINOPHILS ABSOLUTE: 0.1 K/UL (ref 0–0.6)
EOSINOPHILS RELATIVE PERCENT: 2.4 %
GFR AFRICAN AMERICAN: 43
GFR NON-AFRICAN AMERICAN: 36
GLUCOSE BLD-MCNC: 93 MG/DL (ref 70–99)
HCT VFR BLD CALC: 37.4 % (ref 36–48)
HEMOGLOBIN: 12.7 G/DL (ref 12–16)
LYMPHOCYTES ABSOLUTE: 0.8 K/UL (ref 1–5.1)
LYMPHOCYTES RELATIVE PERCENT: 14.1 %
MCH RBC QN AUTO: 30.2 PG (ref 26–34)
MCHC RBC AUTO-ENTMCNC: 34 G/DL (ref 31–36)
MCV RBC AUTO: 88.7 FL (ref 80–100)
MONOCYTES ABSOLUTE: 0.6 K/UL (ref 0–1.3)
MONOCYTES RELATIVE PERCENT: 10.1 %
NEUTROPHILS ABSOLUTE: 4.3 K/UL (ref 1.7–7.7)
NEUTROPHILS RELATIVE PERCENT: 73.1 %
PDW BLD-RTO: 15.5 % (ref 12.4–15.4)
PLATELET # BLD: 147 K/UL (ref 135–450)
PMV BLD AUTO: 7.4 FL (ref 5–10.5)
POTASSIUM SERPL-SCNC: 3.8 MMOL/L (ref 3.5–5.1)
RBC # BLD: 4.21 M/UL (ref 4–5.2)
SODIUM BLD-SCNC: 137 MMOL/L (ref 136–145)
TOTAL PROTEIN: 5.6 G/DL (ref 6.4–8.2)
VANCOMYCIN RANDOM: 11.3 UG/ML
WBC # BLD: 5.9 K/UL (ref 4–11)

## 2022-03-21 PROCEDURE — 97535 SELF CARE MNGMENT TRAINING: CPT

## 2022-03-21 PROCEDURE — 80076 HEPATIC FUNCTION PANEL: CPT

## 2022-03-21 PROCEDURE — 80202 ASSAY OF VANCOMYCIN: CPT

## 2022-03-21 PROCEDURE — 6370000000 HC RX 637 (ALT 250 FOR IP): Performed by: INTERNAL MEDICINE

## 2022-03-21 PROCEDURE — 85025 COMPLETE CBC W/AUTO DIFF WBC: CPT

## 2022-03-21 PROCEDURE — 80048 BASIC METABOLIC PNL TOTAL CA: CPT

## 2022-03-21 PROCEDURE — 94760 N-INVAS EAR/PLS OXIMETRY 1: CPT

## 2022-03-21 PROCEDURE — 97530 THERAPEUTIC ACTIVITIES: CPT

## 2022-03-21 PROCEDURE — 36415 COLL VENOUS BLD VENIPUNCTURE: CPT

## 2022-03-21 PROCEDURE — 1200000000 HC SEMI PRIVATE

## 2022-03-21 PROCEDURE — 99232 SBSQ HOSP IP/OBS MODERATE 35: CPT | Performed by: INTERNAL MEDICINE

## 2022-03-21 PROCEDURE — 92526 ORAL FUNCTION THERAPY: CPT

## 2022-03-21 PROCEDURE — 82140 ASSAY OF AMMONIA: CPT

## 2022-03-21 PROCEDURE — 6370000000 HC RX 637 (ALT 250 FOR IP): Performed by: NURSE PRACTITIONER

## 2022-03-21 PROCEDURE — 97129 THER IVNTJ 1ST 15 MIN: CPT

## 2022-03-21 RX ADMIN — ACETAMINOPHEN 750 MG: 500 TABLET ORAL at 23:32

## 2022-03-21 RX ADMIN — ATORVASTATIN CALCIUM 80 MG: 80 TABLET, FILM COATED ORAL at 20:41

## 2022-03-21 RX ADMIN — CEFDINIR 300 MG: 300 CAPSULE ORAL at 09:53

## 2022-03-21 RX ADMIN — BUDESONIDE 3 MG: 3 CAPSULE, GELATIN COATED ORAL at 10:00

## 2022-03-21 RX ADMIN — ASPIRIN 81 MG: 81 TABLET, COATED ORAL at 09:53

## 2022-03-21 RX ADMIN — ACETAMINOPHEN 750 MG: 500 TABLET ORAL at 05:52

## 2022-03-21 RX ADMIN — CARVEDILOL 12.5 MG: 12.5 TABLET, FILM COATED ORAL at 16:43

## 2022-03-21 RX ADMIN — RIVAROXABAN 15 MG: 15 TABLET, FILM COATED ORAL at 09:49

## 2022-03-21 RX ADMIN — CARVEDILOL 12.5 MG: 12.5 TABLET, FILM COATED ORAL at 09:49

## 2022-03-21 RX ADMIN — ACETAMINOPHEN 750 MG: 500 TABLET ORAL at 17:57

## 2022-03-21 RX ADMIN — Medication 1000 UNITS: at 20:41

## 2022-03-21 RX ADMIN — Medication 1 TABLET: at 09:50

## 2022-03-21 RX ADMIN — CEFDINIR 300 MG: 300 CAPSULE ORAL at 20:40

## 2022-03-21 RX ADMIN — ACETAMINOPHEN 750 MG: 500 TABLET ORAL at 13:04

## 2022-03-21 RX ADMIN — Medication 1000 UNITS: at 09:50

## 2022-03-21 RX ADMIN — Medication 1000 UNITS: at 15:15

## 2022-03-21 RX ADMIN — FERROUS SULFATE TAB EC 324 MG (65 MG FE EQUIVALENT) 325 MG: 324 (65 FE) TABLET DELAYED RESPONSE at 09:50

## 2022-03-21 ASSESSMENT — PAIN SCALES - GENERAL
PAINLEVEL_OUTOF10: 0
PAINLEVEL_OUTOF10: 6
PAINLEVEL_OUTOF10: 0
PAINLEVEL_OUTOF10: 2
PAINLEVEL_OUTOF10: 0

## 2022-03-21 NOTE — PROGRESS NOTES
Infectious Disease Follow up Notes  Admit Date: 3/16/2022  Hospital Day: 6    Antibiotics :   IV Vancomycin stopped  Oral Omnicef  STOP 3/23    CHIEF COMPLAINT:     Fevers  Change in mentation   Left side head aches   Procal elevation  UCHE     Subjective interval History :  82 y.o. woman with a past medical history of hypertension, chronic anticoagulation, atrial fibrillation admitted to the hospital secondary to left-sided headache, scalp pain, fever, chills, some confusion. Patient was generally feeling very weak and tired and having left-sided headache was seen in [facility] ED, completed CT of the brain as well as CTA of the neck was negative she was given symptomatic treatment. Following this at home daughter noticed that her mother was more confused and weak hence brought her to the hospital for further evaluation. She recently also had a right lacrimal duct stent removal though Nasal endoscocpy on 3/9/21 , per family she was on oral amoxicillin therapy and completed the course recently . IN the hospital now developed fever T-max 102.4, ongoing left-sided scalp pain but no skin lesions. Location : Left side head aches and pain       Quality : aching        Severity : 8/10    Duration : 7 days       Timing : intermittent  Context : None    Modifying factors : none   Associated signs and symptoms: fevers, no cough no sputum no skin lesions no GI or  symptoms. Interval History : sitting up in [facility] chair and sauceda placed due to urinary retention and > 1L urine drained out and creat is better    Past Medical History:    Past Medical History:   Diagnosis Date    Atrial fibrillation [facility])        Past Surgical History:    History reviewed. No pertinent surgical history.     Current Medications:    Outpatient Medications Marked as Taking for the 3/16/22 encounter [facility] Encounter)   Medication Sig Dispense Refill    alendronate (FOSAMAX) 70 MG tablet Take 70 mg by mouth every 7 days      atorvastatin (LIPITOR) 20 MG tablet Take 20 mg by mouth daily      budesonide (ENTOCORT EC) 3 MG extended release capsule Take 6 mg by mouth every morning      carvedilol (COREG) 12.5 MG tablet Take 12.5 mg by mouth 2 times daily (with meals)      losartan (COZAAR) 50 MG tablet Take 50 mg by mouth daily       rivaroxaban (XARELTO) 15 MG TABS tablet Take 15 mg by mouth daily (with breakfast)      vitamin D 25 MCG (1000 UT) CAPS Take 1,000 Units by mouth in the morning, at noon, and at bedtime      ferrous sulfate (IRON 325) 325 (65 Fe) MG tablet Take 325 mg by mouth daily (with breakfast)      Cyanocobalamin 5000 MCG TBDP Take 5,000 mcg by mouth every 30 days      cetirizine (ZYRTEC) 10 MG tablet Take 10 mg by mouth daily as needed for Allergies      acetaminophen (TYLENOL) 500 MG tablet Take 750 mg by mouth every 6 hours as needed for Pain      calcium carbonate (TUMS) 500 MG chewable tablet Take 1 tablet by mouth 3 times daily as needed for Heartburn      Multiple Vitamins-Minerals (THERAPEUTIC MULTIVITAMIN-MINERALS) tablet Take 1 tablet by mouth daily         Allergies:  Aspirin and Lisinopril    Immunizations :   Immunization History   Administered Date(s) Administered    COVID-19, Moderna, Booster, PF, 50mcg/0.25ml 11/04/2021    COVID-19, Moderna, Primary or Immunocompromised, PF, 100mcg/0.5mL 01/24/2021, 02/21/2021       Social History:   Social History     Tobacco Use    Smoking status: Never Smoker    Smokeless tobacco: Never Used   Vaping Use    Vaping Use: Never used   Substance Use Topics    Alcohol use: Never    Drug use: Never     Social History     Tobacco Use   Smoking Status Never Smoker   Smokeless Tobacco Never Used      Family History : no DVT no COPD       REVIEW OF SYSTEMS:      Constitutional:    fevers,+  Chills,+  night sweats  Eyes:  negative for blurred vision, eye discharge, visual disturbance   HEENT:  negative for hearing loss, ear drainage,nasal congestion  Respiratory:  negative for cough, shortness of breath or hemoptysis   Cardiovascular:  negative for chest pain, palpitations, syncope  Gastrointestinal:  negative for nausea, vomiting, diarrhea, constipation, abdominal pain  Genitourinary:  negative for frequency, dysuria, urinary incontinence, hematuria  Hematologic/Lymphatic:  negative for easy bruising, bleeding and lymphadenopathy  Allergic/Immunologic:  negative for recurrent infections, angioedema, anaphylaxis   Endocrine:  negative for weight changes, polyuria, polydipsia and polyphagia  Musculoskeletal:  negative for joint  pain, swelling, decreased range of motion  Integumentary: No rashes, skin lesions  Neurological:  negative for headaches, slurred speech, unilateral weakness  Psychiatric: negative for hallucinations,confusion,agitation.                 PHYSICAL EXAM:      Vitals:    BP (!) 166/73   Pulse 66   Temp 97.9 °F (36.6 °C) (Oral)   Resp 18   Ht 5' 2\" (1.575 m)   Wt 134 lb 14.7 oz (61.2 kg)   SpO2 93%   BMI 24.68 kg/m²     General Appearance: alert,in no acute distress, no pallor, no icterus   Skin: warm and dry, no rash or erythema  Head: normocephalic and atraumatic  Eyes: pupils equal, round, and reactive to light, conjunctivae normal  ENT: tympanic membrane, external ear and ear canal normal bilaterally, nose without deformity, nasal mucosa and turbinates normal without polyps  Neck: supple and non-tender without mass, no thyromegaly  no cervical lymphadenopathy  Pulmonary/Chest: clear to auscultation bilaterally- no wheezes, rales or rhonchi, normal air movement, no respiratory distress  Cardiovascular: normal rate, regular rhythm, normal S1 and S2, no murmurs, rubs, clicks, or gallops, no carotid bruits  Abdomen: soft, non-tender, non-distended, normal bowel sounds, no masses or organomegaly  Extremities: no cyanosis, clubbing or edema  Musculoskeletal: normal range of motion, no joint swelling, deformity or tenderness  Integumentary: No rashes, no abnormal skin lesions, no petechiae  Neurologic: reflexes normal and symmetric, no cranial nerve deficit  Psych:  Orientation, sensorium, mood normal            Lines: IV    Jorge+      Data Review:    CBC:   Lab Results   Component Value Date    WBC 5.9 03/21/2022    HGB 12.7 03/21/2022    HCT 37.4 03/21/2022    MCV 88.7 03/21/2022     03/21/2022     RENAL:   Lab Results   Component Value Date    CREATININE 1.4 (H) 03/21/2022    BUN 21 (H) 03/21/2022     03/21/2022    K 3.8 03/21/2022     03/21/2022    CO2 22 03/21/2022     SED RATE:   Lab Results   Component Value Date    SEDRATE 25 03/18/2022     CK: No results found for: CKTOTAL  CRP:   Lab Results   Component Value Date    CRP <3.0 03/18/2022     Hepatic Function Panel:   Lab Results   Component Value Date    ALKPHOS 60 07/25/2021    ALT 22 07/25/2021    AST 23 07/25/2021    PROT 5.6 07/25/2021    PROT 6.4 12/21/2010    BILITOT 0.9 07/25/2021    LABALBU 3.3 07/25/2021     UA:  Lab Results   Component Value Date    COLORU YELLOW 03/18/2022    CLARITYU Clear 03/18/2022    GLUCOSEU Negative 03/18/2022    BILIRUBINUR Negative 03/18/2022    KETUA 15 03/18/2022    SPECGRAV 1.019 03/18/2022    BLOODU SMALL 03/18/2022    PHUR 6.0 03/18/2022    PROTEINU TRACE 03/18/2022    UROBILINOGEN 0.2 03/18/2022    NITRU Negative 03/18/2022    LEUKOCYTESUR Negative 03/18/2022    LABMICR YES 03/18/2022    URINETYPE NotGiven 03/18/2022      Urine Microscopic:   Lab Results   Component Value Date    HYALCAST 1 03/18/2022    WBCUA 4 03/18/2022    RBCUA 13 03/18/2022    EPIU 1 03/18/2022     Urine Reflex to Culture: No results found for: URRFLXCULT      MICRO: cultures reviewed and updated by me   Blood Culture:   Lab Results   Component Value Date    [facility]  03/17/2022     No Growth to date. Any change in status will be called. BLOODCULT2  03/17/2022     No Growth to date.   Any change in status will be called. Respiratory Culture:  No results found for: Monna Look  AFB:No results found for: AFBSMEAR  Viral Culture:  No results found for: COVID19  Urine Culture:   No results for input(s): Rom Newton in the last 72 hours. Micro results (current encounter only)    Procedure Component Value Units Date/Time   Respiratory Panel Film Array Report [[id number]] Collected: 03/18/22 0628   Order Status: Completed Updated: 03/18/22 0843    Report SEE IMAGE   Respiratory Panel, Molecular, with COVID-19 (Restricted: peds pts or suitable admitted adults) [[id number]] Collected: 03/18/22 0628   Order Status: Completed Specimen: Respiratory from Nasopharyngeal Updated: 03/18/22 0842    Respiratory Panel PCR --    Respiratory Pathogens Panel PCR Result: Not Detected   See additional report for complete Respiratory Pathogens Panel    Narrative:     ORDER#: [id number]                          ORDERED BY: Jammie Parra   SOURCE: Nasopharyngeal                     COLLECTED:  03/18/22 06:28   ANTIBIOTICS AT APRIL. :                      RECEIVED :  03/18/22 06:33   Culture, Urine [[id number]] Collected: 03/18/22 0420   Order Status: Sent Specimen: Urine, clean catch Updated: 03/18/22 0430   Culture, Blood 1 [[id number]] Collected: 03/17/22 1428   Order Status: Sent Specimen: Blood Updated: 03/17/22 1439   Culture, Blood 2 [[id number]] Collected: 03/17/22 1428   Order Status: Sent Specimen: Blood Updated: 03/17/22 1439       IMAGING:    MRI brain without contrast   Final Result   1. No acute intracranial abnormality. No acute infarct. 2. Minimal global parenchymal volume loss with mild chronic microvascular   ischemic changes. 3. Fluid is seen in the right sphenoid sinus. CT HEAD WO CONTRAST   Final Result   1. No acute intracranial abnormality. 2. Mild cerebral white matter chronic microvascular ischemic disease.    3. Soft tissue volume loss involving the left frontal scalp suggesting   association with scarring. Recommend clinical correlation. All the pertinent images and reports for the current Hospitalization were reviewed by me     Scheduled Meds:   cefdinir  300 mg Oral 2 times per day    acetaminophen  750 mg Oral 4 times per day    rivaroxaban  15 mg Oral Daily with breakfast    budesonide  3 mg Oral QAM    Vitamin D  1,000 Units Oral TID    ferrous sulfate  325 mg Oral Daily with breakfast    therapeutic multivitamin-minerals  1 tablet Oral Daily    atorvastatin  80 mg Oral Nightly    aspirin  81 mg Oral Daily    Or    aspirin  300 mg Rectal Daily    carvedilol  12.5 mg Oral BID WC       Continuous Infusions:      PRN Meds:  cetirizine, calcium carbonate, ondansetron **OR** ondansetron, polyethylene glycol, perflutren lipid microspheres, labetalol      Assessment:     Patient Active Problem List   Diagnosis    Expressive aphasia    Acute cerebrovascular accident (CVA) ([facility] [address]     Fevers chills resolving   Left side head aches/scalp pain   Confusion now resolved  WBC normal  MRI brain  -ve  CT head -ve  Recent eval as out patient CTA neck -ve  Afib on chronic anticoagulation   Recent Rt Lacrimal duct stent removal on 3/9  Per note there was crusting and purulence was placed on oral abx course         Clinical exam is negative for any focus and no skin lesions on the scalp checking for HSV OR vzv, and MRI brain some fluid in the Rt sphenoid sinus but her symptoms or on the Left side of the scalp       normal WBC but Procal every elevated and now down trend concern for Bacterial process hence continuing abx will change to oral given UCHE     Fevers trend down and Procal improving CREAT now tend down after Jorge placement    She had urinary retention > 1 L urine drained out      Labs, Microbiology, Radiology and all the pertinent results from current hospitalization and  care every where were reviewed  by me as a part of the evaluation   Plan:   1.  Procal trend down    2. COVID 19 and Resp virus PCR -VE  3. Blood cx IN PROCESS NGTD   4. UA and Urine cx -VE  5. MRI brain -ve  6. Cont  oral Omnicef stop date placed 2/23  7. Watch creat  8. Jorge in place due to retention     D/w plans per primary     Will sign off         Discussed with patient/Family and Nursing d/w daughter at bed side       Discussed with patient/Family and Nursing staff     Thanks for allowing me to participate in your patient's care and please call me with any questions or concerns.     Ish Johnson MD  Infectious Disease  [facility] ([facility]) Physician  Phone: [phone]   Fax : [phone]

## 2022-03-21 NOTE — PLAN OF CARE
Problem: Pain:  Goal: Pain level will decrease  Description: Pain level will decrease  3/21/2022 0027 by Jasbir Landon RN  Outcome: Ongoing  3/20/2022 1115 by Deangelo Alcaraz RN  Outcome: Ongoing     Problem: Falls - Risk of:  Goal: Will remain free from falls  Description: Will remain free from falls  3/21/2022 0027 by Ever Friday, RN  Outcome: Ongoing  3/20/2022 1115 by Deangelo Alcaraz RN  Outcome: Ongoing  Note: Fall precautions in place. Bed locked and in lowest position. Alarm on. Call light within reach.       Problem: Skin Integrity:  Goal: Will show no infection signs and symptoms  Description: Will show no infection signs and symptoms  3/21/2022 0027 by Jasbir Landon RN  Outcome: Ongoing  3/20/2022 1115 by Deangelo Alcaraz RN  Outcome: Ongoing

## 2022-03-21 NOTE — PROGRESS NOTES
Speech Language/Pathology   SPEECH LANGUAGE AND CLINICAL BEDSIDE SWALLOWING TREATMENT  Speech Therapy Department     [facility]  AGE: 82 y.o. GENDER: female  : 1935  [id number]  EPISODE DATE:  3/16/2022    MEDICAL DIAGNOSIS:   Chief Complaint   Patient presents with    Fall     Pt from home with . In to ED via [address] [facility] per their report \"Pt fell getting up out of chair. \" Pt's daughter called EMS and stated her mom \" seems confused since 6pm last night. \" EMS further reports that the Pt was seen at [address] last night, \" CT was done and they didnt see anything. \" Pt presents tonight to ED A&o x4 at this time on room air. pain 5/10 Headache, pt denies hitting head. ONSET: 3/16/2022         PAST MEDICAL HISTORY        Diagnosis Date    Atrial fibrillation ([facility] [address]        PAST SURGICAL HISTORY    History reviewed. No pertinent surgical history. ALLERGIES    Allergies   Allergen Reactions    Aspirin     Lisinopril      cough     CHART REVIEW:   3/16/2022 pt admitted with c/o expressive aphasia  MD ADMISSION HPI H&P: 82 y. o. female with PMHx of A-fib, TIA, HTN, HLD and recent stent removal from lacrimal duct presented to [facility] confusion and 3 separate episodes of  expressive aphasia in the last 2 days.  Patient was seen at [facility] twice in that timeframe and discharged home both times after neg workup with CT head without contrast and CTA head and neck.  Symptoms returned again tonight.  She is now weak and almost fell tonight while trying to stand and walk.  Family immediately called 911 instead of having her try and stand again. Cande Welsh denies unilateral weakness or paresthesias.  She does feel like it is difficult to get the right words out but family has not noticed any slurring of her words.  No facial droop.      HEAD CT: 3/16/2022      Impression   1. No acute intracranial abnormality.    2. Mild cerebral white matter chronic microvascular ischemic disease. 3. Soft tissue volume loss involving the left frontal scalp suggesting   association with scarring.  Recommend clinical correlation.          BRAIN MRI: 3/17/2022  Impression   1. No acute intracranial abnormality.  No acute infarct. 2. Minimal global parenchymal volume loss with mild chronic microvascular   ischemic changes. 3. Fluid is seen in the right sphenoid sinus. 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 Problem solving, attention and numeric reasoning appear grossly WFL. Extra time for processing is below baseline. Receptive and expressive language now [facility]; daughter does endorse occasional decreased word-finding not observed during session this date. Dysphagia Diagnosis:   Mild oral stage dysphagia characterized by prolonged but adequate with no concern for excess labor or fatigue with regular solids, suspect premature bolus loss to the pharynx with all; Mild pharyngeal stage dysphagia characterized by delayed swallow and decreased laryngeal elevation      Recommended Diet:  Diet Solids Recommendation: Regular  Liquid Consistency Recommendation:  Thin  Meds: PO  Compensatory Swallowing Strategies: Remain upright for 30-45 minutes after meals,Upright as possible for all oral intake    Status of Dysphagia Goals: The patient will tolerate recommended diet without observed clinical signs of aspiration, met  The patient/caregiver will demonstrate understanding of compensatory strategies for improved swallowing safety. met    Goal Status: Speech and Language Goals  Goal 1: pt will improve auditory comprehension at sentence level to min cues met  Goal 2: pt will improve reading comprehension at word level to moderate cues met/exceeded  Goal 3: pt will improve word finding at word level to moderate cues for automatic, confrontation, and/or responsive naming tasks met/exceeded  Goal 4: pt will copy biographical information independently met/exceeded  Goal 5: pt will tolerate ongoing assessment with additional goals to be determined as appropriate imps resolving; continue assessment for potential cog-lang needs    Prognosis  Prognosis for ST: good    Education  Consulted and agree with results and recommendations: Patient,RN,Family member  Patient Education: completed on recs/plans/results  Patient Education Response: Demonstrated understanding,Needs reinforcement      Discharge Recommendations:  Pt will benefit from continued skilled Speech Therapy for Speech and Dysphagia services, prior to returning home. Please accept this as Speech Therapy Discharge status, if pt is discharged prior to next therapy session.     Objective: Assessment/Therapy activities    Treatment this session  Objective:  COMPREHENSION  Auditory Comprehension: [x]WFL []Mild   [] Moderate  []Severe  []To be assessed    EXPRESSION  Verbal Expression: [x]WFL []Mild   [] Moderate  []Severe  []To be assessed     Pragmatics/Social Functioning: [x]WFL []Mild   [] Moderate  []Severe  []To be assessed    MOTOR SPEECH  Motor Speech: [x]WFL []Mild   [] Moderate  []Severe  []To be assessed     VOICE  Voice: [x]WFL []Mild   [] Moderate  []Severe  []To be assessed    COGNITION  []Unable to be assessed secondary to Aphasia     Overall Orientation : [x]WFL []Mild   [] Moderate  []Severe  []To be assessed   []Unable to be assessed secondary to Aphasia     Attention: [x]WFL []Mild   [] Moderate  []Severe  []To be assessed    Memory: []WFL [x]Mild   [] Moderate  []Severe  []To be assessed  Impaired Recall of New Learning    Min cues for immediate and delayed recall of novel information; patient endorses using environmental aids prior to admission; independent recall using note-taking strategy    Problem Solving: [x]WFL []Mild   [] Moderate  []Severe  []To be assessed    Safety/Judgement: [x]WFL []Mild   [] Moderate  []Severe  []To be assessed    DYSPHAGIA TREATMENT   Positioning: Fully upright in chair   PO Trials:  · Thin Liquids: suspect premature bolus loss to the pharynx; delayed swallow; decreased laryngeal elevation  · Regular food: prolonged but adequate mastication; suspect premature bolus loss to the pharynx; delayed swallow; decreased laryngeal elevation      Plan   Plan:    Requires SLP Intervention: Yes  Therapeutic Interventions: Patient/Family education,Diet tolerance monitoring, Cognitive-communication remediation  Duration/Frequency of Treatment: ST to tx 3-5x per week during acute admission    Barriers toward progress toward pt goals:  Cognitive deficit at baseline      Timed Code Treatment: 20  Total Treatment Time: 30     Signed:  Cierra Stringer, #[id number]  Speech-Language Pathologist  Portable phone: [phone]  03/21/22 10:25 AM

## 2022-03-21 NOTE — PROGRESS NOTES
Department of Physical Medicine & Rehabilitation  Progress Note    Patient Identification:  Debbie Leija  [id number]  : 1935  Admit date: 3/16/2022    Chief Complaint: Expressive aphasia    Subjective:   No acute events overnight. Patient developed UCHE and found to have urinary retention on 3/20. Mental status continues to be improved. Labs reviewed. ROS: No f/c, n/v, cp     Objective:  Patient Vitals for the past 24 hrs:   BP Temp Temp src Pulse Resp SpO2 Weight   22 0909 (!) 166/73 97.9 °F (36.6 °C) Oral 66 18 93 %    22 0842     21 97 %    22 0745  97.6 °F (36.4 °C)        22 0433       134 lb 14.7 oz (61.2 kg)   22 0355 (!) 162/65 98.1 °F (36.7 °C) Oral 77 25 98 %    22 0043    74      22 0000    71      22 1926 122/61 97.6 °F (36.4 °C) Oral 71 22 98 %    22 1530 116/69 97.3 °F (36.3 °C) Oral 72 15 97 %    22 1212 113/80 97.9 °F (36.6 °C) Axillary  12 100 %      Const: Alert. No distress, pleasant. HEENT: Normocephalic, atraumatic. Normal sclera/conjunctiva. MMM. CV: Regular rate and rhythm. Resp: No respiratory distress. Lungs CTAB. Abd: Soft, nontender, nondistended, NABS+   Ext: No edema. Neuro: Alert, oriented. Mildly impaired recall. Speech overall fluent with some intermittent anomia, ? Delayed processing. Psych: Cooperative, appropriate mood and affect    Laboratory data: Available via EMR.    Last 24 hour lab  Recent Results (from the past 24 hour(s))   Basic Metabolic Panel    Collection Time: 22  5:20 AM   Result Value Ref Range    Sodium 137 136 - 145 mmol/L    Potassium 3.8 3.5 - 5.1 mmol/L    Chloride 103 99 - 110 mmol/L    CO2 22 21 - 32 mmol/L    Anion Gap 12 3 - 16    Glucose 93 70 - 99 mg/dL    BUN 21 (H) 7 - 20 mg/dL    CREATININE 1.4 (H) 0.6 - 1.2 mg/dL    GFR Non- 36 (A) >60    GFR  43 (A) >60    Calcium 8.9 8.3 - 10.6 mg/dL intiial 24/7 supervision from family. Also recommend home care therapy.      Thank you for this consult. Please contact me with any questions or concerns. Shelley Temple.  Jeimy King MD 3/21/2022, 10:14 AM

## 2022-03-21 NOTE — PROGRESS NOTES
Hospitalist Progress Note      PCP: Rossi Selby MD    Date of Admission: 3/16/2022    Chief Complaint: exp aphasia    Hospital Course: 87f with afib, HTN, HLD, recent lacrimal stent removal after which she was noted to have increased confusion and complaint of a headache. Work up for cva was negative. She was also febrile and found to have an elevated procal with no obvious infection. Mentation has improved, however on 3/20, she was found to have urinary retention of 1 L    Subjective: No distress on room air, denies nausea/vomiting/diarrhea, denies worsening headache, ,sob, cough. Medications:  Reviewed    Infusion Medications   Scheduled Medications    cefdinir  300 mg Oral 2 times per day    acetaminophen  750 mg Oral 4 times per day    rivaroxaban  15 mg Oral Daily with breakfast    budesonide  3 mg Oral QAM    Vitamin D  1,000 Units Oral TID    ferrous sulfate  325 mg Oral Daily with breakfast    therapeutic multivitamin-minerals  1 tablet Oral Daily    atorvastatin  80 mg Oral Nightly    aspirin  81 mg Oral Daily    Or    aspirin  300 mg Rectal Daily    carvedilol  12.5 mg Oral BID WC     PRN Meds: cetirizine, calcium carbonate, ondansetron **OR** ondansetron, polyethylene glycol, perflutren lipid microspheres, labetalol      Intake/Output Summary (Last 24 hours) at 3/21/2022 1109  Last data filed at 3/21/2022 0837  Gross per 24 hour   Intake 480.01 ml   Output 3325 ml   Net -2844.99 ml       Physical Exam Performed:    BP (!) 166/73   Pulse 66   Temp 97.9 °F (36.6 °C) (Oral)   Resp 18   Ht 5' 2\" (1.575 m)   Wt 134 lb 14.7 oz (61.2 kg)   SpO2 93%   BMI 24.68 kg/m²     General appearance: No apparent distress, appears stated age and cooperative. HEENT: Pupils equal, round,  Conjunctivae/corneas clear. Neck: Supple, with full range of motion. No jugular venous distention. Trachea midline. Respiratory:  Normal respiratory effort.  No use of accessory muscles  Cardiovascular: Regular rate and rhythm    Abdomen: Soft, non-tender, non-distended   Musculoskeletal: No clubbing, cyanosis or edema bilaterally. Skin: Skin color, texture, turgor normal.    Neurologic:  grossly non-focal.  Psychiatric: Alert and oriented, thought content appropriate, normal insight         Labs:   Recent Labs     03/19/22  0550 03/20/22  0537 03/21/22  0520   WBC 6.5 6.5 5.9   HGB 13.1 12.9 12.7   HCT 39.2 38.1 37.4    139 147     Recent Labs     03/19/22  0550 03/20/22  0537 03/21/22  0520   * 132* 137   K 3.2* 3.5 3.8   CL 98* 99 103   CO2 20* 20* 22   BUN 16 27* 21*   CREATININE 0.6 1.8* 1.4*   CALCIUM 8.4 8.7 8.9     No results for input(s): AST, ALT, BILIDIR, BILITOT, ALKPHOS in the last 72 hours. No results for input(s): INR in the last 72 hours. No results for input(s): Ethelene Soulier in the last 72 hours. Urinalysis:      Lab Results   Component Value Date    NITRU Negative 03/18/2022    WBCUA 4 03/18/2022    RBCUA 13 03/18/2022    BLOODU SMALL 03/18/2022    SPECGRAV 1.019 03/18/2022    GLUCOSEU Negative 03/18/2022       Radiology:  MRI brain without contrast   Final Result   1. No acute intracranial abnormality. No acute infarct. 2. Minimal global parenchymal volume loss with mild chronic microvascular   ischemic changes. 3. Fluid is seen in the right sphenoid sinus. CT HEAD WO CONTRAST   Final Result   1. No acute intracranial abnormality. 2. Mild cerebral white matter chronic microvascular ischemic disease. 3. Soft tissue volume loss involving the left frontal scalp suggesting   association with scarring. Recommend clinical correlation. Assessment/Plan:    Active Hospital Problems    Diagnosis Date Noted    Expressive aphasia [R47.01] 03/17/2022    Acute cerebrovascular accident (CVA) ([facility] [address] [I63.9] 03/17/2022     AMS  - suspected toxic    Fever  - unclear source, cx neg, on continued abx.   - procal up, poss sec to uche    UCHE  - over 1 liter urine out following sauceda insertion  - improving    DVT Prophylaxis: xarelto   Diet: ADULT DIET;  Regular  Code Status: Full Code         Bela De La Rosa MD

## 2022-03-21 NOTE — PROGRESS NOTES
Occupational Therapy  Facility/Department: [address] PROGRESSIVE CARE  Daily Treatment Note  NAME: Rafal Crawford  : 1935  MRN: [id number]    Date of Service: 3/21/2022    Discharge Recommendations:  Patient would benefit from continued therapy after discharge,2-3 sessions per [facility] hour supervision or assist  OT Equipment Recommendations  Walker: [address] scored a 20/24 on the AM-PAC ADL Inpatient form. Current research shows that an AM-PAC score of 18 or greater is typically associated with a discharge to the patient's home setting. Based on the patient's AM-PAC score, and their current ADL deficits, it is recommended that the patient have 2-3 sessions per week of Occupational Therapy at d/c to increase the patient's independence. At this time, this patient demonstrates the endurance and safety to discharge home with [facility] and a follow up treatment frequency of 2-3x/wk. Please see assessment section for further patient specific details. If patient discharges prior to next session this note will serve as a discharge summary. Please see below for the latest assessment towards goals. Assessment   Performance deficits / Impairments: Decreased functional mobility ; Decreased strength;Decreased endurance;Decreased ADL status; Decreased safe awareness;Decreased cognition;Decreased balance;Decreased high-level IADLs  Assessment: Discussed with OTR: Pt continues to make progress. Pt completing transfers, mob with SB/Supervision, with cues needed for safe walker use. Pt demonstrated ability to complete ADL's with SBA/Supervision(standing for tasks at sink for extended time, then amb in room to complete functional tasks). Discussed pt's safety with mob/tasks and recommend use of walker bag/basket, and reacher. Pt's family voicing concerns of pt attempting to assist her spouse who has increasingly been requiring more physical assistance.  Pt is not safe to continue to attempting assisting her spouse at this time. Anticipate pt will be safe to d/c home with HHOT and with increase in home health services for pt's spouse. Prognosis: Good  OT Education: OT Role;Transfer Training;Plan of Care;ADL Adaptive Strategies; Family Education  Patient Education: Recommendations for d/c-home health OT, increased home care services for pt's spouse. Recommended use of safety items for functional tasks: walker bag/basket and reacher. REQUIRES OT FOLLOW UP: Yes  Activity Tolerance  Activity Tolerance: Patient Tolerated treatment well  Safety Devices  Safety Devices in place: Yes  Type of devices: Nurse notified         Patient Diagnosis(es): The encounter diagnosis was Stroke-like symptoms. has a past medical history of Atrial fibrillation ([facility] [address] has no past surgical history on file. Restrictions  Restrictions/Precautions  Restrictions/Precautions: Fall Risk  Subjective   General  Chart Reviewed: Yes,Orders,Progress Notes,Imaging,Labs  Patient assessed for rehabilitation services?: Yes  Additional Pertinent Hx: per CNP note, 82 y.o. female with PMHx of A-fib, TIA, HTN, HLD and recent stent removal from lacrimal duct presented to [facility] with confusion and 3 separate episodes of  expressive aphasia in the last 2 days. Patient was seen at [address] twice in that timeframe and discharged home both times after neg workup with CT head without contrast and CTA head and neck. Symptoms returned again tonight. She is now weak and almost fell tonight while trying to stand and walk. Family immediately called 911 instead of having her try and stand again. She denies unilateral weakness or paresthesias. She does feel like it is difficult to get the right words out but family has not noticed any slurring of her words. No facial droop.   Family / Caregiver Present: Yes (daughter)  Referring Practitioner: SHIRLEY Barnett CNP  Subjective  Subjective: Pt met BS, in recliner and agreeable to therapy. Pt without complaints. Pt with continued improved speech and cognition. Orientation  Orientation  Overall Orientation Status: Within Functional Limits  Objective    ADL  Grooming: Supervision (stance at sink to brush/comb hair)  LE Dressing: Supervision (doffs/donns footies, by crossing Le's)  Additional Comments: Anticipate pt needing up to SBA A for ADLs based on ROM, strength, and balance observed        Standing Balance  Time: 7-8 min  Activity: ADL's, functional mob  Functional Mobility  Functional - Mobility Device: Rolling Walker  Activity: To/from bathroom; Retrieve items  Assist Level: Stand by assistance  Functional Mobility Comments: Pt amb in room with RW, SBA. Pt practiced reaching for item low in closet (shoes). Discussed pt using a reacher to grab items off floor/low cabinets etc, for safety. Also recommend pt use a walker bag or basket to safely carry items. Pt (and daughter verb understanding).   Toilet Transfers  Toilet - Technique: Ambulating  Equipment Used: Grab bars  Toilet Transfer: Stand by [employer] Transfers Comments: RW,cues for hand placement  Wheelchair [employer] Transfers  Wheelchair/Bed - Technique: Ambulating  Equipment Used: Bed (recliner)  Level of Asssistance: Stand by [facility] Transfers Comments: RW, cues for hand placement  Bed mobility  Supine to Sit: Supervision (on high bed, as at home)  Sit to Supine: Supervision         Cognition  Overall Cognitive Status: WFL  Cognition Comment: Diminished carryover of new learner for safe walker techniques            Plan   Plan  Times per week: 3-5x  Current Treatment Recommendations: Strengthening,Functional Mobility Training,Gait Training,Endurance Training,Balance Training,Safety Education & Training,Self-Care / ADL,Patient/Caregiver Education & Training    AM-PAC Score        [facility] Inpatient Daily Activity Raw Score: 20 (03/21/22 1228)  AM-PAC Inpatient ADL T-Scale Score : 42.03 (03/21/22 1228)  ADL Inpatient CMS 0-100% Score: 38.32 (03/21/22 1228)  ADL Inpatient CMS G-Code Modifier : CJ (03/21/22 1228)     Goals  Short term goals  Time Frame for Short term goals: prior to D/C; Status: progressing, ongoing  Short term goal 1: complete functional mobility and transfers Mod Ind  Short term goal 2: complete bathing and dressing Mod Ind  Short term goal 3: complete toileting Mod Ind  Short term goal 4: complete grooming in stance at sink Mod Ind  Long term goals  Time Frame for Long term goals : STG=LTG  Patient Goals   Patient goals : return to PLOF       Therapy Time   Individual Concurrent Group Co-treatment   Time In 1045         Time Out 1129         Minutes [address] [place]/L,515

## 2022-03-21 NOTE — PLAN OF CARE
Problem: Pain:  Goal: Pain level will decrease  Description: Pain level will decrease  3/21/2022 0753 by Bay Conn RN  Outcome: Ongoing  3/21/2022 0027 by Phuong Barakat RN  Outcome: Ongoing  Goal: Control of acute pain  Description: Control of acute pain  3/21/2022 0753 by Bay Conn RN  Outcome: Ongoing  3/21/2022 0027 by Phuong Barakat RN  Outcome: Ongoing  Goal: Control of chronic pain  Description: Control of chronic pain  3/21/2022 0753 by Bay Conn RN  Outcome: Ongoing  3/21/2022 0027 by Phuong Barakat RN  Outcome: Ongoing     Problem: Falls - Risk of:  Goal: Will remain free from falls  Description: Will remain free from falls  3/21/2022 0753 by Bay Conn RN  Outcome: Ongoing  3/21/2022 0027 by Phuong Barakat RN  Outcome: Ongoing  Goal: Absence of physical injury  Description: Absence of physical injury  3/21/2022 0753 by Bay Conn RN  Outcome: Ongoing  3/21/2022 0027 by Phuong Barakat RN  Outcome: Ongoing     Problem: Skin Integrity:  Goal: Will show no infection signs and symptoms  Description: Will show no infection signs and symptoms  3/21/2022 0753 by Bay Conn RN  Outcome: Ongoing  3/21/2022 0027 by Phuong Barakat RN  Outcome: Ongoing  Goal: Absence of new skin breakdown  Description: Absence of new skin breakdown  3/21/2022 0753 by Bay Conn RN  Outcome: Ongoing  3/21/2022 0027 by Phuong Barakat RN  Outcome: Ongoing

## 2022-03-22 LAB
ANION GAP SERPL CALCULATED.3IONS-SCNC: 13 MMOL/L (ref 3–16)
BASOPHILS ABSOLUTE: 0 K/UL (ref 0–0.2)
BASOPHILS RELATIVE PERCENT: 0.5 %
BUN BLDV-MCNC: 22 MG/DL (ref 7–20)
CALCIUM SERPL-MCNC: 9.1 MG/DL (ref 8.3–10.6)
CHLORIDE BLD-SCNC: 104 MMOL/L (ref 99–110)
CO2: 22 MMOL/L (ref 21–32)
CREAT SERPL-MCNC: 1.3 MG/DL (ref 0.6–1.2)
EOSINOPHILS ABSOLUTE: 0.1 K/UL (ref 0–0.6)
EOSINOPHILS RELATIVE PERCENT: 2.2 %
GFR AFRICAN AMERICAN: 47
GFR NON-AFRICAN AMERICAN: 39
GLUCOSE BLD-MCNC: 95 MG/DL (ref 70–99)
HCT VFR BLD CALC: 36.3 % (ref 36–48)
HEMOGLOBIN: 12.3 G/DL (ref 12–16)
LYMPHOCYTES ABSOLUTE: 1.1 K/UL (ref 1–5.1)
LYMPHOCYTES RELATIVE PERCENT: 21.3 %
MCH RBC QN AUTO: 30 PG (ref 26–34)
MCHC RBC AUTO-ENTMCNC: 33.8 G/DL (ref 31–36)
MCV RBC AUTO: 88.8 FL (ref 80–100)
MONOCYTES ABSOLUTE: 0.5 K/UL (ref 0–1.3)
MONOCYTES RELATIVE PERCENT: 9.1 %
NEUTROPHILS ABSOLUTE: 3.5 K/UL (ref 1.7–7.7)
NEUTROPHILS RELATIVE PERCENT: 66.9 %
PDW BLD-RTO: 15.6 % (ref 12.4–15.4)
PLATELET # BLD: 167 K/UL (ref 135–450)
PMV BLD AUTO: 7.3 FL (ref 5–10.5)
POTASSIUM SERPL-SCNC: 3.5 MMOL/L (ref 3.5–5.1)
RBC # BLD: 4.09 M/UL (ref 4–5.2)
SODIUM BLD-SCNC: 139 MMOL/L (ref 136–145)
WBC # BLD: 5.2 K/UL (ref 4–11)

## 2022-03-22 PROCEDURE — 85025 COMPLETE CBC W/AUTO DIFF WBC: CPT

## 2022-03-22 PROCEDURE — 6370000000 HC RX 637 (ALT 250 FOR IP): Performed by: NURSE PRACTITIONER

## 2022-03-22 PROCEDURE — 51798 US URINE CAPACITY MEASURE: CPT

## 2022-03-22 PROCEDURE — 80048 BASIC METABOLIC PNL TOTAL CA: CPT

## 2022-03-22 PROCEDURE — 6370000000 HC RX 637 (ALT 250 FOR IP): Performed by: INTERNAL MEDICINE

## 2022-03-22 PROCEDURE — 1200000000 HC SEMI PRIVATE

## 2022-03-22 PROCEDURE — 51701 INSERT BLADDER CATHETER: CPT

## 2022-03-22 PROCEDURE — 97530 THERAPEUTIC ACTIVITIES: CPT

## 2022-03-22 PROCEDURE — 97116 GAIT TRAINING THERAPY: CPT

## 2022-03-22 PROCEDURE — 6370000000 HC RX 637 (ALT 250 FOR IP): Performed by: HOSPITALIST

## 2022-03-22 PROCEDURE — 36415 COLL VENOUS BLD VENIPUNCTURE: CPT

## 2022-03-22 PROCEDURE — 97535 SELF CARE MNGMENT TRAINING: CPT

## 2022-03-22 RX ORDER — TAMSULOSIN HYDROCHLORIDE 0.4 MG/1
0.4 CAPSULE ORAL DAILY
Status: DISCONTINUED | OUTPATIENT
Start: 2022-03-22 | End: 2022-03-24 | Stop reason: HOSPADM

## 2022-03-22 RX ADMIN — ATORVASTATIN CALCIUM 80 MG: 80 TABLET, FILM COATED ORAL at 20:50

## 2022-03-22 RX ADMIN — Medication 1000 UNITS: at 20:50

## 2022-03-22 RX ADMIN — BUDESONIDE 3 MG: 3 CAPSULE, GELATIN COATED ORAL at 09:35

## 2022-03-22 RX ADMIN — Medication 1 TABLET: at 09:32

## 2022-03-22 RX ADMIN — Medication 1000 UNITS: at 14:28

## 2022-03-22 RX ADMIN — FERROUS SULFATE TAB EC 324 MG (65 MG FE EQUIVALENT) 325 MG: 324 (65 FE) TABLET DELAYED RESPONSE at 08:15

## 2022-03-22 RX ADMIN — CARVEDILOL 12.5 MG: 12.5 TABLET, FILM COATED ORAL at 08:15

## 2022-03-22 RX ADMIN — Medication 1000 UNITS: at 09:32

## 2022-03-22 RX ADMIN — CEFDINIR 300 MG: 300 CAPSULE ORAL at 20:49

## 2022-03-22 RX ADMIN — ACETAMINOPHEN 750 MG: 500 TABLET ORAL at 05:41

## 2022-03-22 RX ADMIN — CARVEDILOL 12.5 MG: 12.5 TABLET, FILM COATED ORAL at 17:27

## 2022-03-22 RX ADMIN — ASPIRIN 81 MG: 81 TABLET, COATED ORAL at 09:32

## 2022-03-22 RX ADMIN — ACETAMINOPHEN 750 MG: 500 TABLET ORAL at 17:27

## 2022-03-22 RX ADMIN — TAMSULOSIN HYDROCHLORIDE 0.4 MG: 0.4 CAPSULE ORAL at 11:39

## 2022-03-22 RX ADMIN — ACETAMINOPHEN 750 MG: 500 TABLET ORAL at 11:39

## 2022-03-22 RX ADMIN — RIVAROXABAN 15 MG: 15 TABLET, FILM COATED ORAL at 08:15

## 2022-03-22 RX ADMIN — CEFDINIR 300 MG: 300 CAPSULE ORAL at 09:32

## 2022-03-22 ASSESSMENT — PAIN SCALES - GENERAL
PAINLEVEL_OUTOF10: 0

## 2022-03-22 NOTE — PROGRESS NOTES
Occupational Therapy  Facility/Department: [address] PROGRESSIVE CARE  Daily Treatment Note  NAME: Tawnya Rowe  : 1935  MRN: [id number]    Date of Service: 3/22/2022    Discharge Recommendations:  Patient would benefit from continued therapy after discharge,2-3 sessions per [facility] hour supervision or assist  OT Equipment Recommendations  Walker: [address] scored a 20/24 on the AM-PAC ADL Inpatient form. Current research shows that an AM-PAC score of 18 or greater is typically associated with a discharge to the patient's home setting. Based on the patient's AM-PAC score, and their current ADL deficits, it is recommended that the patient have 2-3 sessions per week of Occupational Therapy at d/c to increase the patient's independence. At this time, this patient demonstrates the endurance and safety to discharge home with [facility] and a follow up treatment frequency of 2-3x/wk. Please see assessment section for further patient specific details. If patient discharges prior to next session this note will serve as a discharge summary. Please see below for the latest assessment towards goals. Assessment   Performance deficits / Impairments: Decreased functional mobility ; Decreased strength;Decreased endurance;Decreased ADL status; Decreased safe awareness;Decreased cognition;Decreased balance;Decreased high-level IADLs  Assessment: Discussed with OTR:Pt SBA for functional mob to SB/Supervision, for transfers with improved recall for safe hand placement. Pt SB/Supervision for toileting/grooming -standing. Anticipate pt will be safe to d/c home with HHOT. Prognosis: Good  OT Education: OT Role;Transfer Training;Plan of Care;ADL Adaptive Strategies; Family Education  Patient Education: Reinforced recommendation for walker basket or tray. Daughter present stating family will purchase.   REQUIRES OT FOLLOW UP: Yes  Activity Tolerance  Activity Tolerance: Patient Tolerated treatment well  Safety Devices  Safety Devices in place: Yes         Patient Diagnosis(es): The encounter diagnosis was Stroke-like symptoms. has a past medical history of Atrial fibrillation ([address] has no past surgical history on file. Restrictions  Restrictions/Precautions  Restrictions/Precautions: Fall Risk  Subjective   General  Chart Reviewed: Yes,Orders,Progress Notes  Patient assessed for rehabilitation services?: Yes  Additional Pertinent Hx: per CNP note, 82 y.o. female with PMHx of A-fib, TIA, HTN, HLD and recent stent removal from lacrimal duct presented to [facility] with confusion and 3 separate episodes of  expressive aphasia in the last 2 days. Patient was seen at [address] twice in that timeframe and discharged home both times after neg workup with CT head without contrast and CTA head and neck. Symptoms returned again tonight. She is now weak and almost fell tonight while trying to stand and walk. Family immediately called 911 instead of having her try and stand again. She denies unilateral weakness or paresthesias. She does feel like it is difficult to get the right words out but family has not noticed any slurring of her words. No facial droop. Family / Caregiver Present: Yes (daughter and grandson)  Referring Practitioner: SHIRLEY Mancilla CNP  Subjective  Subjective: Pt amb in hallway with RN when approached for OT. Pt agreeable to OT. Pt denied pain, yet c/o needing to urinate in order to go home today.       Orientation  Orientation  Overall Orientation Status: Within Functional Limits  Objective    ADL  Grooming: Supervision  Toileting: Supervision;Stand by assistance        Standing Balance  Activity: Functional mob, ADL's  Functional Mobility  Functional - Mobility Device: Rolling Walker  Activity: To/from bathroom  Assist Level: Stand by assistance  Toilet Transfers  Toilet - Technique: Ambulating  Equipment Used: Grab bars  Toilet Transfer: Stand by assistance;Supervision  Toilet Transfers Comments: RW,cues for hand placement  Wheelchair Bed Transfers  Wheelchair/Bed - Technique: Ambulating  Equipment Used:  (arm chairs.)  Level of Asssistance: Stand by assistance;Supervision  Wheelchair Transfers Comments: RW. Did not require cues for hand placement         Cognition  Overall Cognitive Status: Exceptions  Arousal/Alertness: Appropriate responses to stimuli  Memory: Decreased recall of recent events;Decreased short term memory  Safety Judgement: Decreased awareness of need for safety  Cognition Comment: Diminished carryover of new learner for safe walker techniques         Plan   Plan  Times per week: 3-5x  Current Treatment Recommendations: Strengthening,Functional Mobility Training,Gait Training,Endurance Training,Balance Training,Safety Education & Training,Self-Care / ADL,Patient/Caregiver Education & Training    AM-PAC Score        AM-PAC Inpatient Daily Activity Raw Score: 20 (03/22/22 1455)  AM-PAC Inpatient ADL T-Scale Score : 42.03 (03/22/22 1455)  ADL Inpatient CMS 0-100% Score: 38.32 (03/22/22 1455)  ADL Inpatient CMS G-Code Modifier : Donald Otto (03/22/22 1455)    Goals  Short term goals  Time Frame for Short term goals: prior to D/C; Status: goals not met, ongoing  Short term goal 1: complete functional mobility and transfers Mod Ind  Short term goal 2: complete bathing and dressing Mod Ind  Short term goal 3: complete toileting Mod Ind  Short term goal 4: complete grooming in stance at sink Mod Ind  Long term goals  Time Frame for Long term goals : STG=LTG  Patient Goals   Patient goals : return to PLOF       Therapy Time   Individual Concurrent Group Co-treatment   Time In 1426         Time Out 1459         Minutes [address] [place]/L,515

## 2022-03-22 NOTE — PROGRESS NOTES
Pt is alert and oriented x 4 sitting up in her wheelchair with visitors. She is tolerating PO intake well with no nausea or vomiting. Pt has a sauceda draining yellow, clear urine. Pt has no complaints at this time. Call light within reach. Able to make needs known. Fall precautions in place. Will monitor.  Electronically signed by Ruth Tanner on 3/22/2022 at 1:28 PM

## 2022-03-22 NOTE — CARE COORDINATION
Discharge Planning:   Met with patient, daughter, and grandson at bedside. Discussed discharge plan. Patient confirmed plan is to return to home with [facility]. IMM Letter provided. Confirmed family to transport home. Called to [facility] [phone]. Spoke to Sagar Kidd, confirmed they are still able to accept at discharge. Notified of anticipated discharge today versus tomorrow. NEED: Home care orders and completed LOS.    MONSE Powell, RUBEN, Social Work/Case Management   [phone]  Electronically signed by MONSE Powell, RUBEN on 3/22/2022 at 2:29 PM

## 2022-03-22 NOTE — PLAN OF CARE
Problem: Pain:  Description: Pain management should include both nonpharmacologic and pharmacologic interventions.   Goal: Pain level will decrease  Description: Pain level will decrease  3/22/2022 1308 by Aliyah Martínez RN  Outcome: Ongoing    Goal: Control of acute pain  Description: Control of acute pain  Outcome: Ongoing     Problem: Falls - Risk of:  Goal: Will remain free from falls  Description: Will remain free from falls  3/22/2022 1308 by Aliyah Martínez RN  Outcome: Ongoing    Goal: Absence of physical injury  Description: Absence of physical injury  3/22/2022 1308 by Aliyah Martínez RN  Outcome: Ongoing       Problem: Skin Integrity:  Goal: Will show no infection signs and symptoms  Description: Will show no infection signs and symptoms  3/22/2022 1308 by Aliyah Martínez RN  Outcome: Ongoing    Goal: Absence of new skin breakdown  Description: Absence of new skin breakdown  3/22/2022 1308 by Aliyah Martínez RN  Outcome: Ongoing

## 2022-03-22 NOTE — PROGRESS NOTES
Physical Therapy  Facility/Department: [address] PROGRESSIVE CARE  Daily Treatment Note  NAME: Andres Butler  : 1935  MRN: [id number]    Date of Service: 3/22/2022    Discharge Recommendations:  Continue to assess pending progress,Home with Home health PT   PT Equipment Recommendations  Equipment Needed: Keyla Montilla. Other: Will monitor for potential equipt needs. Andres Butler scored a 20/24 on the AM-PAC short mobility form. Current research shows that an AM-PAC score of 18 or greater is typically associated with a discharge to the patient's home setting. Based on the patient's AM-PAC score and their current functional mobility deficits, it is recommended that the patient have 2-3 sessions per week of Physical Therapy at d/c to increase the patient's independence. At this time, this patient demonstrates the endurance and safety to discharge home with Home PT Evaluation  and a follow up treatment frequency of 2-3x/wk. Please see assessment section for further patient specific details. If patient discharges prior to next session this note will serve as a discharge summary. Please see below for the latest assessment towards goals. Assessment   Body structures, Functions, Activity limitations: Decreased functional mobility ; Decreased balance  Assessment: 81 y/o female admit 3/16/2022 with Confusion and 3 separate episodes of Aphasia. To ED ([facility]) x 2 with Confusion/Headache. CT head negative; d/c home. Since admit CT Head negative. MRI pending. PMH as noted including TIA, A-Fib, Recent Stent removed from Lacrimal Duct. PTA pt living with  in home with few steps to enter and 1st floor bed/bath; independent daily care and functional mobility (without assist device). Improving mental status and carryover with cues for safe functional activities. Currently,  Pt amb short distances within hospital room setting and additional 125' with Walker SBA. Steady. Pt/family feel as if appror level of assist available for d/c home. Will recommend Home PT. Prognosis: Good  History: 81 y/o female admit 3/16/2022 with Confusion and 3 separate episodes of Aphasia. To ED ([facility]) x 2 with Confusion/Headache. CT head negative; d/c home. Since admit CT Head negative. MRI pending. PMH as noted including TIA, A-Fib, Recent Stent removed from Lacrimal Duct. Exam: See above. Clinical Presentation: See above. Patient Education: Role of PT, POC, Need to call for assist, Safe use of Walker. Barriers to Learning: WFLs. REQUIRES PT FOLLOW UP: Yes  Activity Tolerance  Activity Tolerance: Patient Tolerated treatment well  Activity Tolerance: Improving mental status and carryover with cues for safe functional activities. Pt amb short distances within hospital room setting and additional 125' with Walker SBA. Steady. Patient Diagnosis(es): The encounter diagnosis was Stroke-like symptoms. has a past medical history of Atrial fibrillation ([facility] [address] has no past surgical history on file. Restrictions  Restrictions/Precautions  Restrictions/Precautions: Fall Risk  Subjective   General  Chart Reviewed: Yes  Additional Pertinent Hx: 81 y/o female admit 3/16/2022 with Confusion and 3 separate episodes of Aphasia. To ED ([facility]) x 2 with Confusion/Headache. CT head negative; d/c home. Since admit CT Head negative. MRI negative. PMH as noted including TIA, A-Fib, Recent Stent removed from Lacrimal Duct. Response To Previous Treatment: Patient with no complaints from previous session. Family / Caregiver Present: Yes (Dtr, Grandson.)  Referring Practitioner: Clarence Kirk NP. Subjective  Subjective: Pt/dtr agreeable to PT Rx. Feeling better, hopeful to return home.           Orientation  Orientation  Overall Orientation Status: Within Functional Limits (Improving mental status and carryover with cues given.)  Cognition   Cognition  Overall In 1330         Time Out [address] Aarti Perdue

## 2022-03-22 NOTE — PROGRESS NOTES
Assisted patient to the bathroom. Patient requested to sit on the toilet for a while to attempt to have a BM, and will call when ready to get off the toilet.

## 2022-03-22 NOTE — PLAN OF CARE
Problem: Falls - Risk of:  Goal: Will remain free from falls  Outcome: Met This Shift     Problem: Falls - Risk of:  Goal: Absence of physical injury  Outcome: Met This Shift     Problem: Pain:  Goal: Pain level will decrease  Outcome: Ongoing  Note: Pain level and characteristics of pain assessed. Patient included in decisions related to pain management. Pain medications given as ordered after other non-medication management options have been attempted. Effectiveness of pain medications assessed and ineffective pain management reported to MD as needed. Tylenol has been effective for pain.      Problem: Skin Integrity:  Goal: Will show no infection signs and symptoms  Outcome: Ongoing     Problem: Skin Integrity:  Goal: Absence of new skin breakdown  Outcome: Ongoing

## 2022-03-22 NOTE — PROGRESS NOTES
Hospitalist Progress Note      PCP: Leesa Carson MD    Date of Admission: 3/16/2022    Chief Complaint: exp aphasia    Hospital Course: 87f with afib, HTN, HLD, recent lacrimal stent removal after which she was noted to have increased confusion and complaint of a headache. Work up for cva was negative. She was also febrile and found to have an elevated procal with no obvious infection. Mentation has improved, however on 3/20, she was found to have urinary retention of 1 L    Subjective: No distress on room air, denies nausea/vomiting/diarrhea, denies worsening headache, ,sob, cough. Unable to void s/p sauceda removal / starting flomax      Medications:  Reviewed    Infusion Medications   Scheduled Medications    tamsulosin  0.4 mg Oral Daily    cefdinir  300 mg Oral 2 times per day    acetaminophen  750 mg Oral 4 times per day    rivaroxaban  15 mg Oral Daily with breakfast    budesonide  3 mg Oral QAM    Vitamin D  1,000 Units Oral TID    ferrous sulfate  325 mg Oral Daily with breakfast    therapeutic multivitamin-minerals  1 tablet Oral Daily    atorvastatin  80 mg Oral Nightly    aspirin  81 mg Oral Daily    Or    aspirin  300 mg Rectal Daily    carvedilol  12.5 mg Oral BID WC     PRN Meds: cetirizine, calcium carbonate, ondansetron **OR** ondansetron, polyethylene glycol, perflutren lipid microspheres, labetalol      Intake/Output Summary (Last 24 hours) at 3/22/2022 1556  Last data filed at 3/22/2022 1524  Gross per 24 hour   Intake 2200 ml   Output 2875 ml   Net -675 ml       Physical Exam Performed:    BP (!) 141/72   Pulse 72   Temp 97.7 °F (36.5 °C) (Oral)   Resp 16   Ht 5' 2\" (1.575 m)   Wt 138 lb 0.1 oz (62.6 kg)   SpO2 97%   BMI 25.24 kg/m²     General appearance: No apparent distress, appears stated age and cooperative. HEENT: Pupils equal, round,  Conjunctivae/corneas clear. Neck: Supple, with full range of motion. No jugular venous distention.  Trachea midline. Respiratory:  Normal respiratory effort. No use of accessory muscles  Cardiovascular: Regular rate and rhythm    Abdomen: Soft, non-tender, non-distended   Musculoskeletal: No clubbing, cyanosis or edema bilaterally. Skin: Skin color, texture, turgor normal.    Neurologic:  grossly non-focal.  Psychiatric: Alert and oriented, thought content appropriate, normal insight         Labs:   Recent Labs     03/20/22  0537 03/21/22  0520 03/22/22  0533   WBC 6.5 5.9 5.2   HGB 12.9 12.7 12.3   HCT 38.1 37.4 36.3    147 167     Recent Labs     03/20/22  0537 03/21/22  0520 03/22/22  0533   * 137 139   K 3.5 3.8 3.5   CL 99 103 104   CO2 20* 22 22   BUN 27* 21* 22*   CREATININE 1.8* 1.4* 1.3*   CALCIUM 8.7 8.9 9.1     Recent Labs     03/21/22  1840   AST 30   ALT 20   BILIDIR <0.2   BILITOT 0.4   ALKPHOS 81     No results for input(s): INR in the last 72 hours. No results for input(s): Burnice Ravalli in the last 72 hours. Urinalysis:      Lab Results   Component Value Date    NITRU Negative 03/18/2022    WBCUA 4 03/18/2022    RBCUA 13 03/18/2022    BLOODU SMALL 03/18/2022    SPECGRAV 1.019 03/18/2022    GLUCOSEU Negative 03/18/2022       Radiology:  MRI brain without contrast   Final Result   1. No acute intracranial abnormality. No acute infarct. 2. Minimal global parenchymal volume loss with mild chronic microvascular   ischemic changes. 3. Fluid is seen in the right sphenoid sinus. CT HEAD WO CONTRAST   Final Result   1. No acute intracranial abnormality. 2. Mild cerebral white matter chronic microvascular ischemic disease. 3. Soft tissue volume loss involving the left frontal scalp suggesting   association with scarring. Recommend clinical correlation.                  Assessment/Plan:    Active Hospital Problems    Diagnosis Date Noted    Expressive aphasia [R47.01] 03/17/2022    Acute cerebrovascular accident (CVA) ([facility] [address] [I63.9] 03/17/2022     AMS  - suspected toxic    Fever  - unclear source, cx neg, on continued abx. - procal up, poss sec to uche    UCHE  - over 1 liter urine out following sauceda insertion  - Urology consult, home with sauceda? DVT Prophylaxis: xarelto   Diet: ADULT DIET;  Regular  Code Status: Full Code         Rory Lazaro MD

## 2022-03-22 NOTE — DISCHARGE INSTR - COC
Continuity of Care Form    Patient Name: Hector Edwards   :  1935  MRN:  [id number]    Admit date:  3/16/2022  Discharge date:  3/24/22    Code Status Order: Full Code   Advance Directives:      Admitting Physician:  Arnaldo Cummings DO  PCP: Mojgan Ortega MD    Discharging Nurse: [facility] Unit/Room#: A1T-0191/5584-03  Discharging Unit Phone Number: ***    Emergency Contact:   Extended Emergency Contact Information  Primary Emergency Contact: [facility]  Address: [address], [place] [address]  Home Phone: [phone]  Relation: Spouse  Secondary Emergency Contact: Uriel Kumari, [facility] [address] Phone: [phone]  Mobile Phone: [phone]  Relation: Child   needed? No    Past Surgical History:  History reviewed. No pertinent surgical history.     Immunization History:   Immunization History   Administered Date(s) Administered    COVID-19, Moderna, Booster, PF, 50mcg/0.25ml 2021    COVID-19, Lance Trudi, Primary or Immunocompromised, PF, 100mcg/0.5mL 2021, 2021       Active Problems:  Patient Active Problem List   Diagnosis Code    Expressive aphasia R47.01    Acute cerebrovascular accident (CVA) ([facility] [address] I63.9       Isolation/Infection:   Isolation            No Isolation          Patient Infection Status       Infection Onset Added Last Indicated Last Indicated By Review Planned Expiration Resolved Resolved By    None active    Resolved    COVID-19 (Rule Out) 22 Respiratory Panel, Molecular, with COVID-19 (Restricted: peds pts or suitable admitted adults) (Ordered)   22 Rule-Out Test Resulted            Nurse Assessment:  Last Vital Signs: BP (!) 165/70   Pulse 68   Temp 97.9 °F (36.6 °C) (Oral)   Resp 22   Ht 5' 2\" (1.575 m)   Wt 138 lb 0.1 oz (62.6 kg)   SpO2 92%   BMI 25.24 kg/m²     Last documented pain score (0-10 scale): Pain Level: 0  Last Weight:   Wt Readings from Last 1 Encounters:   22 138 lb 0.1 oz (62.6 kg)     Mental Status:  oriented and alert    IV Access:  - None    Nursing Mobility/ADLs:  Walking   Assisted  Transfer  Assisted  Bathing  Assisted  Dressing  Assisted  Toileting  Assisted  Feeding  Independent  Med Admin  Assisted  Med Delivery   whole    Wound Care Documentation and Therapy:        Elimination:  Continence: Bowel: Yes  Bladder: Yes  Urinary Catheter: None   Colostomy/Ileostomy/Ileal Conduit: No       Date of Last BM: 3/23/22    Intake/Output Summary (Last 24 hours) at 3/22/2022 0910  Last data filed at 3/22/2022 0804  Gross per 24 hour   Intake 1720 ml   Output 2700 ml   Net -980 ml     I/O last 3 completed shifts: In: 2200 [P.O.:2200]  Out: [address] [Urine:4225]    Safety Concerns: At Risk for Falls    Impairments/Disabilities:      None    Nutrition Therapy:  Current Nutrition Therapy:   - Oral Diet:  General    Routes of Feeding: Oral  Liquids: Thin Liquids  Daily Fluid Restriction: no  Last Modified Barium Swallow with Video (Video Swallowing Test): not done    Treatments at the Time of Hospital Discharge:   Respiratory Treatments: ***  Oxygen Therapy:  is not on home oxygen therapy. Ventilator:    - No ventilator support    Rehab Therapies: Physical Therapy and Occupational Therapy  Weight Bearing Status/Restrictions: No weight bearing restrictions  Other Medical Equipment (for information only, NOT a DME order):  walker  Other Treatments: ***    Patient's personal belongings (please select all that are sent with patient):   All belongings sent with patient / daughter    RN SIGNATURE:  Electronically signed by Ina Puckett RN on 3/24/22 at 12:49 PM EDT    CASE MANAGEMENT/SOCIAL WORK SECTION    Inpatient Status Date: 3/17/2022    Readmission Risk Assessment Score:  Readmission Risk              Risk of Unplanned Readmission:  14           Discharging to Facility/ Agency   Name: [facility]  Address:  [address] [place] [address] Phone:  [phone]  Fax:  [phone]     Dialysis Facility (if applicable)   Name:  Address:  Dialysis Schedule:  Phone:  Fax:    / signature: Electronically signed by MONSE Merritt LSW on 3/22/22 at 9:10 AM EDT    PHYSICIAN SECTION    Prognosis: Good    Condition at Discharge: Stable    Rehab Potential (if transferring to Rehab): Good    Recommended Labs or Other Treatments After Discharge: PT/OT/visiting RN    Physician Certification: I certify the above information and transfer of Acie Done  is necessary for the continuing treatment of the diagnosis listed and that she requires [place] for less 30 days.      Update Admission H&P: No change in H&P    PHYSICIAN SIGNATURE:  Electronically signed by Ronit Garzon MD on 3/24/22 at 11:57 AM EDT

## 2022-03-23 LAB
ANION GAP SERPL CALCULATED.3IONS-SCNC: 11 MMOL/L (ref 3–16)
BASOPHILS ABSOLUTE: 0 K/UL (ref 0–0.2)
BASOPHILS RELATIVE PERCENT: 0.6 %
BUN BLDV-MCNC: 23 MG/DL (ref 7–20)
CALCIUM SERPL-MCNC: 9 MG/DL (ref 8.3–10.6)
CHLORIDE BLD-SCNC: 104 MMOL/L (ref 99–110)
CO2: 21 MMOL/L (ref 21–32)
CREAT SERPL-MCNC: 1.1 MG/DL (ref 0.6–1.2)
EOSINOPHILS ABSOLUTE: 0.1 K/UL (ref 0–0.6)
EOSINOPHILS RELATIVE PERCENT: 1.9 %
GFR AFRICAN AMERICAN: 57
GFR NON-AFRICAN AMERICAN: 47
GLUCOSE BLD-MCNC: 98 MG/DL (ref 70–99)
HCT VFR BLD CALC: 34.8 % (ref 36–48)
HEMOGLOBIN: 11.8 G/DL (ref 12–16)
LYMPHOCYTES ABSOLUTE: 0.9 K/UL (ref 1–5.1)
LYMPHOCYTES RELATIVE PERCENT: 17 %
MCH RBC QN AUTO: 30.1 PG (ref 26–34)
MCHC RBC AUTO-ENTMCNC: 33.9 G/DL (ref 31–36)
MCV RBC AUTO: 88.8 FL (ref 80–100)
MONOCYTES ABSOLUTE: 0.5 K/UL (ref 0–1.3)
MONOCYTES RELATIVE PERCENT: 9.8 %
NEUTROPHILS ABSOLUTE: 3.8 K/UL (ref 1.7–7.7)
NEUTROPHILS RELATIVE PERCENT: 70.7 %
PDW BLD-RTO: 15.4 % (ref 12.4–15.4)
PLATELET # BLD: 167 K/UL (ref 135–450)
PMV BLD AUTO: 7.5 FL (ref 5–10.5)
POTASSIUM SERPL-SCNC: 3.9 MMOL/L (ref 3.5–5.1)
RBC # BLD: 3.92 M/UL (ref 4–5.2)
SODIUM BLD-SCNC: 136 MMOL/L (ref 136–145)
WBC # BLD: 5.4 K/UL (ref 4–11)

## 2022-03-23 PROCEDURE — 36415 COLL VENOUS BLD VENIPUNCTURE: CPT

## 2022-03-23 PROCEDURE — 6370000000 HC RX 637 (ALT 250 FOR IP): Performed by: NURSE PRACTITIONER

## 2022-03-23 PROCEDURE — 1200000000 HC SEMI PRIVATE

## 2022-03-23 PROCEDURE — 97129 THER IVNTJ 1ST 15 MIN: CPT

## 2022-03-23 PROCEDURE — 97130 THER IVNTJ EA ADDL 15 MIN: CPT

## 2022-03-23 PROCEDURE — 94760 N-INVAS EAR/PLS OXIMETRY 1: CPT

## 2022-03-23 PROCEDURE — 85025 COMPLETE CBC W/AUTO DIFF WBC: CPT

## 2022-03-23 PROCEDURE — 6370000000 HC RX 637 (ALT 250 FOR IP): Performed by: HOSPITALIST

## 2022-03-23 PROCEDURE — 6370000000 HC RX 637 (ALT 250 FOR IP): Performed by: INTERNAL MEDICINE

## 2022-03-23 PROCEDURE — 51798 US URINE CAPACITY MEASURE: CPT

## 2022-03-23 PROCEDURE — 51701 INSERT BLADDER CATHETER: CPT

## 2022-03-23 PROCEDURE — 80048 BASIC METABOLIC PNL TOTAL CA: CPT

## 2022-03-23 RX ADMIN — BUDESONIDE 3 MG: 3 CAPSULE, GELATIN COATED ORAL at 09:38

## 2022-03-23 RX ADMIN — ACETAMINOPHEN 750 MG: 500 TABLET ORAL at 17:31

## 2022-03-23 RX ADMIN — RIVAROXABAN 15 MG: 15 TABLET, FILM COATED ORAL at 08:17

## 2022-03-23 RX ADMIN — ACETAMINOPHEN 750 MG: 500 TABLET ORAL at 12:06

## 2022-03-23 RX ADMIN — CEFDINIR 300 MG: 300 CAPSULE ORAL at 09:38

## 2022-03-23 RX ADMIN — FERROUS SULFATE TAB EC 324 MG (65 MG FE EQUIVALENT) 325 MG: 324 (65 FE) TABLET DELAYED RESPONSE at 08:17

## 2022-03-23 RX ADMIN — ACETAMINOPHEN 750 MG: 500 TABLET ORAL at 00:12

## 2022-03-23 RX ADMIN — CARVEDILOL 12.5 MG: 12.5 TABLET, FILM COATED ORAL at 17:31

## 2022-03-23 RX ADMIN — Medication 1000 UNITS: at 14:34

## 2022-03-23 RX ADMIN — Medication 1000 UNITS: at 20:54

## 2022-03-23 RX ADMIN — TAMSULOSIN HYDROCHLORIDE 0.4 MG: 0.4 CAPSULE ORAL at 09:38

## 2022-03-23 RX ADMIN — CARVEDILOL 12.5 MG: 12.5 TABLET, FILM COATED ORAL at 08:17

## 2022-03-23 RX ADMIN — ASPIRIN 81 MG: 81 TABLET, COATED ORAL at 09:38

## 2022-03-23 RX ADMIN — ATORVASTATIN CALCIUM 80 MG: 80 TABLET, FILM COATED ORAL at 20:54

## 2022-03-23 RX ADMIN — ACETAMINOPHEN 750 MG: 500 TABLET ORAL at 05:39

## 2022-03-23 RX ADMIN — Medication 1 TABLET: at 09:38

## 2022-03-23 RX ADMIN — Medication 1000 UNITS: at 09:38

## 2022-03-23 ASSESSMENT — PAIN SCALES - GENERAL
PAINLEVEL_OUTOF10: 0

## 2022-03-23 NOTE — PROGRESS NOTES
Speech Language/Pathology   SPEECH LANGUAGE AND CLINICAL BEDSIDE SWALLOWING TREATMENT  Speech Therapy Department     Maegan Bustos  AGE: 82 y.o. GENDER: female  : 1935  [id number]  EPISODE DATE:  3/16/2022    MEDICAL DIAGNOSIS:   Chief Complaint   Patient presents with    Fall     Pt from home with . In to ED via [facility] per their report \"Pt fell getting up out of chair. \" Pt's daughter called EMS and stated her mom \" seems confused since 6pm last night. \" EMS further reports that the Pt was seen at [address] last night, \" CT was done and they didnt see anything. \" Pt presents tonight to ED A&o x4 at this time on room air. pain 5/10 Headache, pt denies hitting head. ONSET: 3/16/2022         PAST MEDICAL HISTORY        Diagnosis Date    Atrial fibrillation ([facility] [address]        PAST SURGICAL HISTORY    History reviewed. No pertinent surgical history. ALLERGIES    Allergies   Allergen Reactions    Aspirin     Lisinopril      cough     CHART REVIEW:   3/16/2022 pt admitted with c/o expressive aphasia  MD ADMISSION HPI H&P: 82 y. o. female with PMHx of A-fib, TIA, HTN, HLD and recent stent removal from lacrimal duct presented to [facility] confusion and 3 separate episodes of  expressive aphasia in the last 2 days.  Patient was seen at [facility] twice in that timeframe and discharged home both times after neg workup with CT head without contrast and CTA head and neck.  Symptoms returned again tonight.  She is now weak and almost fell tonight while trying to stand and walk.  Family immediately called 911 instead of having her try and stand again. Meg Robbins denies unilateral weakness or paresthesias.  She does feel like it is difficult to get the right words out but family has not noticed any slurring of her words.  No facial droop.      HEAD CT: 3/16/2022      Impression   1. No acute intracranial abnormality.    2. Mild cerebral white matter chronic microvascular ischemic disease. 3. Soft tissue volume loss involving the left frontal scalp suggesting   association with scarring.  Recommend clinical correlation.          BRAIN MRI: 3/17/2022  Impression   1. No acute intracranial abnormality.  No acute infarct. 2. Minimal global parenchymal volume loss with mild chronic microvascular   ischemic changes. 3. Fluid is seen in the right sphenoid sinus. 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 Problem solving, attention and numeric reasoning appear grossly WFL. Extra time for processing is reported now near baseline.      Goal Status: Speech and Language Goals  Goal 1: pt will improve auditory comprehension at sentence level to min cues met  Goal 2: pt will improve reading comprehension at word level to moderate cues met/exceeded  Goal 3: pt will improve word finding at word level to moderate cues for automatic, confrontation, and/or responsive naming tasks met/exceeded  Goal 4: pt will copy biographical information independently met/exceeded  Goal 5: pt will tolerate ongoing assessment with additional goals to be determined as appropriate imps resolving; continue assessment for potential cog-lang needs    Prognosis  Prognosis for ST: good    Education  Consulted and agree with results and recommendations: Cordelia Monroy member  Patient Education: completed on recs/plans/results  Patient Education Response: Demonstrated understanding,Needs reinforcement      Discharge Recommendations:  Pt will benefit from continued skilled Speech Therapy for Speech and Dysphagia services, prior to returning home. Please accept this as Speech Therapy Discharge status, if pt is discharged prior to next therapy session.     Objective: Assessment/Therapy activities    Treatment this session  Objective:  COMPREHENSION  Auditory Comprehension: [x]WFL with extra time []Mild   [] Moderate  []Severe  []To be assessed  Requires extra time for processing prior to responding to treatment tasks/prompts    EXPRESSION  Verbal Expression: [x]WFL []Mild   [] Moderate  []Severe  []To be assessed     Pragmatics/Social Functioning: [x]WFL []Mild   [] Moderate  []Severe  []To be assessed    MOTOR SPEECH  Motor Speech: [x]WFL []Mild   [] Moderate  []Severe  []To be assessed     VOICE  Voice: [x]WFL []Mild   [] Moderate  []Severe  []To be assessed    COGNITION  []Unable to be assessed secondary to Aphasia     Overall Orientation : [x]WFL []Mild   [] Moderate  []Severe  []To be assessed   []Unable to be assessed secondary to Aphasia     Attention: [x]WFL []Mild   [] Moderate  []Severe  []To be assessed    Memory: []WFL [x]Mild   [] Moderate  []Severe  []To be assessed  Impaired Recall of New Learning    Min cues for immediate recall of novel information; independent for delayed recall of novel information with set-up/training    Problem Solving: [x]WFL []Mild   [] Moderate  []Severe  []To be assessed    Safety/Judgement: [x]WFL []Mild   [] Moderate  []Severe  []To be assessed      Plan   Plan:    Requires SLP Intervention: Yes  Therapeutic Interventions: Patient/Family education,Diet tolerance monitoring, Cognitive-communication remediation  Duration/Frequency of Treatment: ST to tx 3-5x per week during acute admission    Barriers toward progress toward pt goals:  Cognitive deficit at baseline    Timed Code Treatment: 30  Total Treatment Time: 30     Signed:  Basilia London, [id number] [facility] [id number]  Speech-Language Pathologist  Portable phone: [phone]  03/23/22 2:30 PM

## 2022-03-23 NOTE — PLAN OF CARE
Problem: Pain:  Description: Pain management should include both nonpharmacologic and pharmacologic interventions.   Goal: Pain level will decrease  Description: Pain level will decrease  3/23/2022 1318 by Linda Hrenandez RN  Outcome: Ongoing    Goal: Control of acute pain  Description: Control of acute pain  3/23/2022 1318 by Linda Hernandez RN  Outcome: Ongoing       Problem: Falls - Risk of:  Goal: Will remain free from falls  Description: Will remain free from falls  3/23/2022 1318 by Linda Hernandez RN  Outcome: Ongoing    Goal: Absence of physical injury  Description: Absence of physical injury  3/23/2022 1318 by Linda Hernandez RN  Outcome: Ongoing       Problem: Skin Integrity:  Goal: Will show no infection signs and symptoms  Description: Will show no infection signs and symptoms  3/23/2022 1318 by Linda Hernandez RN  Outcome: Ongoing    Goal: Absence of new skin breakdown  Description: Absence of new skin breakdown  3/23/2022 1318 by Linda Hernandez RN  Outcome: Ongoing

## 2022-03-23 NOTE — CONSULTS
Consulting Physician:Robert    Reason for Consult: Urinary retention    History of Present Illness: Norma Guido is a 82 y.o. admitted withan acute cerebrovascular accident and was found to have 1 L of urine in her bladder. Jorge catheter was placed. Catheter was discontinued yesterday and the patient is been voiding with high postvoid residuals. The patient denies any urinary difficulties prior to this hospitalization. She is feeling when her bladder is full. She is ambulatory. She denies any constipation. Her residual urines have been as high as 600 cc. She is been voiding is much is 300 cc at a time. This had been improving over the day yesterday. Past Medical History:   Past Medical History:   Diagnosis Date    Atrial fibrillation [facility])        Past Surgical History:  History reviewed. No pertinent surgical history. Social History:  Social History     Socioeconomic History    Marital status:      Spouse name: Not on file    Number of children: Not on file    Years of education: Not on file    Highest education level: Not on file   Occupational History    Not on file   Tobacco Use    Smoking status: Never Smoker    Smokeless tobacco: Never Used   Vaping Use    Vaping Use: Never used   Substance and Sexual Activity    Alcohol use: Never    Drug use: Never    Sexual activity: Not on file   Other Topics Concern    Not on file   Social History Narrative    Not on file     Social Determinants of Health     Financial Resource Strain:     Difficulty of Paying Living Expenses: Not on file   Food Insecurity:     Worried About [address] in the Last Year: Not on file    [address] in the Last Year: Not on file   Transportation Needs:     Lack of Transportation (Medical): Not on file    Lack of Transportation (Non-Medical):  Not on file   Physical Activity:     Days of Exercise per Week: Not on file    Minutes of Exercise per Session: Not on file   Stress:  Feeling of Stress : Not on file   Social Connections:     Frequency of Communication with Friends and Family: Not on file    Frequency of Social Gatherings with Friends and Family: Not on file    Attends Episcopal Services: Not on file    Active Member of Clubs or Organizations: Not on file    Attends Club or Organization Meetings: Not on file    Marital Status: Not on file   Intimate Partner Violence:     Fear of Current or Ex-Partner: Not on file    Emotionally Abused: Not on file    Physically Abused: Not on file    Sexually Abused: Not on file   Housing Stability:     Unable to Pay for Housing in the Last Year: Not on file    Number of [place] in the Last Year: Not on file    Unstable Housing in the Last Year: Not on file       Family History:  History reviewed. No pertinent family history.     Meds:   Current Facility-Administered Medications: tamsulosin (FLOMAX) capsule 0.4 mg, 0.4 mg, Oral, Daily  acetaminophen (TYLENOL) tablet 750 mg, 750 mg, Oral, 4 times per day  rivaroxaban (XARELTO) tablet 15 mg, 15 mg, Oral, Daily with breakfast  budesonide (ENTOCORT EC) extended release capsule 3 mg, 3 mg, Oral, QAM  Vitamin D (CHOLECALCIFEROL) tablet 1,000 Units, 1,000 Units, Oral, TID  ferrous sulfate EC tablet 325 mg, 325 mg, Oral, Daily with breakfast  cetirizine (ZYRTEC) tablet 10 mg, 10 mg, Oral, Daily PRN  calcium carbonate (TUMS) chewable tablet 500 mg, 1 tablet, Oral, TID PRN  therapeutic multivitamin-minerals 1 tablet, 1 tablet, Oral, Daily  ondansetron (ZOFRAN-ODT) disintegrating tablet 4 mg, 4 mg, Oral, Q8H PRN **OR** ondansetron (ZOFRAN) injection 4 mg, 4 mg, IntraVENous, Q6H PRN  polyethylene glycol (GLYCOLAX) packet 17 g, 17 g, Oral, Daily PRN  perflutren lipid microspheres (DEFINITY) injection 1.65 mg, 1.5 mL, IntraVENous, ONCE PRN  atorvastatin (LIPITOR) tablet 80 mg, 80 mg, Oral, Nightly  labetalol (NORMODYNE;TRANDATE) injection 10 mg, 10 mg, IntraVENous, Q10 Min PRN  aspirin EC tablet 81 mg, 81 mg, Oral, Daily **OR** aspirin suppository 300 mg, 300 mg, Rectal, Daily  carvedilol (COREG) tablet 12.5 mg, 12.5 mg, Oral, BID WC    Vitals:  BP (!) 158/75   Pulse 70   Temp 97.5 °F (36.4 °C) (Oral)   Resp 20   Ht 5' 2\" (1.575 m)   Wt 136 lb 14.5 oz (62.1 kg)   SpO2 97%   BMI 25.04 kg/m²     Intake/Output Summary (Last 24 hours) at 3/23/2022 1322  Last data filed at 3/23/2022 0615  Gross per 24 hour   Intake 880 ml   Output 1825 ml   Net -945 ml       Review of Systems:  10 Systems were reviewed and negative except as in HPI      Physical Exam:  General Appearance: Alert and oriented, cooperative, no distress, appears stated age   Labs:  CBC   Lab Results   Component Value Date    WBC 5.4 03/23/2022    RBC 3.92 03/23/2022    HGB 11.8 03/23/2022    HCT 34.8 03/23/2022    MCV 88.8 03/23/2022    MCH 30.1 03/23/2022    MCHC 33.9 03/23/2022    RDW 15.4 03/23/2022     03/23/2022    MPV 7.5 03/23/2022     BMP   Lab Results   Component Value Date     03/23/2022    K 3.9 03/23/2022    K 3.6 03/17/2022     03/23/2022    CO2 21 03/23/2022    BUN 23 03/23/2022    CREATININE 1.1 03/23/2022    GLUCOSE 98 03/23/2022    CALCIUM 9.0 03/23/2022       Urinalysis:   Lab Results   Component Value Date    COLORU YELLOW 03/18/2022    GLUCOSEU Negative 03/18/2022    BLOODU SMALL 03/18/2022    NITRU Negative 03/18/2022    LEUKOCYTESUR Negative 03/18/2022       Imaging: NA  Impression/Plan: incomplete bladder emptying status post recent acute CVA. Spontaneously improving. Suspect she has a large capacity bladder and probably wasn't emptying completely prior to hospitalization.  -I counseled the patient and her daughter that it would be best if we continued intermittent catheterization for residuals greater than 600 cc, or less if she is uncomfortable with a high residual.I am optimistic things will spontaneously improve. Will check on her tomorrow.   Jones Sheikh, DO 3/23/44148:22 PM

## 2022-03-23 NOTE — PLAN OF CARE
Problem: Pain:  Goal: Pain level will decrease  3/23/2022 0104 by Clint Stewart RN  Outcome: Met This Shift  Note: Pain level and characteristics of pain assessed. Patient included in decisions related to pain management. Pain medications given as ordered after other non-medication management options have been attempted. Effectiveness of pain medications assessed and ineffective pain management reported to MD as needed. Pain has been controlled with Tylenol as ordered. Problem: Pain:  Goal: Control of acute pain  3/23/2022 0104 by Clint Stewart RN  Outcome: Met This Shift     Problem: Pain:  Goal: Control of chronic pain  Outcome: Met This Shift     Problem: Falls - Risk of:  Goal: Will remain free from falls  3/23/2022 0104 by Clint Stewart RN  Outcome: Met This Shift  Note: Fall risk assessment completed every shift. All precautions in place. Pt has call light within reach at all times. Room clear of clutter. Pt aware to call for assistance when getting up. Bed alarm on. No falls this shift.       Problem: Falls - Risk of:  Goal: Absence of physical injury  3/23/2022 0104 by Clint Stewart RN  Outcome: Met This Shift     Problem: Skin Integrity:  Goal: Will show no infection signs and symptoms  3/23/2022 0104 by Clint Stewart RN  Outcome: Ongoing     Problem: Skin Integrity:  Goal: Absence of new skin breakdown  3/23/2022 0104 by Clint Stewart RN  Outcome: Ongoing

## 2022-03-23 NOTE — PROGRESS NOTES
Hospitalist Progress Note      PCP: Emeli Bryson MD    Date of Admission: 3/16/2022    Chief Complaint: exp aphasia    Hospital Course: 87f with afib, HTN, HLD, recent lacrimal stent removal after which she was noted to have increased confusion and complaint of a headache. Work up for cva was negative. She was also febrile and found to have an elevated procal with no obvious infection. Mentation has improved, however on 3/20, she was found to have urinary retention of 1 L    Subjective: No distress on room air, denies nausea/vomiting/diarrhea, denies worsening headache, ,sob, cough. Unable to void s/p sauceda removal / starting flomax      Medications:  Reviewed    Infusion Medications   Scheduled Medications    tamsulosin  0.4 mg Oral Daily    acetaminophen  750 mg Oral 4 times per day    rivaroxaban  15 mg Oral Daily with breakfast    budesonide  3 mg Oral QAM    Vitamin D  1,000 Units Oral TID    ferrous sulfate  325 mg Oral Daily with breakfast    therapeutic multivitamin-minerals  1 tablet Oral Daily    atorvastatin  80 mg Oral Nightly    aspirin  81 mg Oral Daily    Or    aspirin  300 mg Rectal Daily    carvedilol  12.5 mg Oral BID WC     PRN Meds: cetirizine, calcium carbonate, ondansetron **OR** ondansetron, polyethylene glycol, perflutren lipid microspheres, labetalol      Intake/Output Summary (Last 24 hours) at 3/23/2022 1853  Last data filed at 3/23/2022 1843  Gross per 24 hour   Intake 400 ml   Output 2325 ml   Net -1925 ml       Physical Exam Performed:    BP (!) 172/82   Pulse 73   Temp 98.1 °F (36.7 °C) (Axillary)   Resp 19   Ht 5' 2\" (1.575 m)   Wt 136 lb 14.5 oz (62.1 kg)   SpO2 96%   BMI 25.04 kg/m²     General appearance: No apparent distress, appears stated age and cooperative. HEENT: Pupils equal, round,  Conjunctivae/corneas clear. Neck: Supple, with full range of motion. No jugular venous distention. Trachea midline. Respiratory:  Normal respiratory effort. No use of accessory muscles  Cardiovascular: Regular rate and rhythm    Abdomen: Soft, non-tender, non-distended   Musculoskeletal: No clubbing, cyanosis or edema bilaterally. Skin: Skin color, texture, turgor normal.    Neurologic:  grossly non-focal.  Psychiatric: Alert and oriented, thought content appropriate, normal insight         Labs:   Recent Labs     03/21/22  0520 03/22/22  0533 03/23/22  0646   WBC 5.9 5.2 5.4   HGB 12.7 12.3 11.8*   HCT 37.4 36.3 34.8*    167 167     Recent Labs     03/21/22  0520 03/22/22  0533 03/23/22  0646    139 136   K 3.8 3.5 3.9    104 104   CO2 22 22 21   BUN 21* 22* 23*   CREATININE 1.4* 1.3* 1.1   CALCIUM 8.9 9.1 9.0     Recent Labs     03/21/22  1840   AST 30   ALT 20   BILIDIR <0.2   BILITOT 0.4   ALKPHOS 81     No results for input(s): INR in the last 72 hours. No results for input(s): Evonnie Montane in the last 72 hours. Urinalysis:      Lab Results   Component Value Date    NITRU Negative 03/18/2022    WBCUA 4 03/18/2022    RBCUA 13 03/18/2022    BLOODU SMALL 03/18/2022    SPECGRAV 1.019 03/18/2022    GLUCOSEU Negative 03/18/2022       Radiology:  MRI brain without contrast   Final Result   1. No acute intracranial abnormality. No acute infarct. 2. Minimal global parenchymal volume loss with mild chronic microvascular   ischemic changes. 3. Fluid is seen in the right sphenoid sinus. CT HEAD WO CONTRAST   Final Result   1. No acute intracranial abnormality. 2. Mild cerebral white matter chronic microvascular ischemic disease. 3. Soft tissue volume loss involving the left frontal scalp suggesting   association with scarring. Recommend clinical correlation.                  Assessment/Plan:    Active Hospital Problems    Diagnosis Date Noted    Expressive aphasia [R47.01] 03/17/2022    Acute cerebrovascular accident (CVA) ([facility] [address] [I63.9] 03/17/2022     AMS  - suspected toxic    Fever  - unclear source, cx neg, on continued abx.  - procal up, poss sec to uche    UCHE  - over 1 liter urine out following sauceda insertion  - Urology consult appreciated, straight cath prn    DVT Prophylaxis: xarelto   Diet: ADULT DIET;  Regular  Code Status: Full Code         Hima Mayorga MD

## 2022-03-23 NOTE — FLOWSHEET NOTE
03/22/22 1427   IMM Letter   IMM Letter given to Patient/Family/Significant other/Guardian/POA/by: Provided to patient and daughter at bedside by Julia Tello MSW, LSW. Education provided, patient and daughter reported no questions. Copy provided to patient and copy placed in chart.    IMM Letter date given: 03/22/22   IMM Letter time given: 4603
Updated night shift hospitalist, Dr. Efren Dunn, of [facility] bladder scan. Orders given for PRN scan every shift and straight cath for > 500 PRN. Urology to see today. Mayito Morris RN
English

## 2022-03-23 NOTE — PROGRESS NOTES
Physician Progress Note      Moi Larsen  CSN #:                  [id number]  :                       1935  ADMIT DATE:       3/16/2022 9:41 PM  [address] [place] DATE:  RESPONDING  PROVIDER #:        Sveta Linton MD          QUERY TEXT:    Pt admitted with aphasia. Pt noted to have fevers, AMS and elevated   procalcitonin, If possible, please document in the progress notes and   discharge summary if you are evaluating and /or treating any of the following: The medical record reflects the following:  Risk Factors: 88 yof  Clinical Indicators: temp 102. 4. procalcitonin 13.38, encephalopathy - per   neuro \"She is now febrile so could consider metabolic/infectious cause (?viral   CNS infection). \"  now with UCHE - Cr increased to 1.8  Treatment: ID consult, Tylenol PO, IV ABX, Blood Cultures, IVF, urine culture,   CT of head  Options provided:  -- Sepsis due to unknown source present on admission  -- Sepsis due to other source, please specify. -- Other - I will add my own diagnosis  -- Disagree - Not applicable / Not valid  -- Disagree - Clinically unable to determine / Unknown  -- Refer to Clinical Documentation Reviewer    PROVIDER RESPONSE TEXT:    This patient has sepsis due to unknown source which was present on admission.     Query created by: Ranjeet Boston on 3/22/2022 12:29 PM      Electronically signed by:  Sveta Linton MD 3/22/2022 9:06 PM

## 2022-03-24 VITALS
HEART RATE: 69 BPM | TEMPERATURE: 97.4 F | SYSTOLIC BLOOD PRESSURE: 127 MMHG | DIASTOLIC BLOOD PRESSURE: 90 MMHG | RESPIRATION RATE: 20 BRPM | OXYGEN SATURATION: 99 % | HEIGHT: 62 IN | BODY MASS INDEX: 25.6 KG/M2 | WEIGHT: 139.11 LBS

## 2022-03-24 LAB
ANION GAP SERPL CALCULATED.3IONS-SCNC: 13 MMOL/L (ref 3–16)
BASOPHILS ABSOLUTE: 0 K/UL (ref 0–0.2)
BASOPHILS RELATIVE PERCENT: 0.5 %
BUN BLDV-MCNC: 21 MG/DL (ref 7–20)
CALCIUM SERPL-MCNC: 9.1 MG/DL (ref 8.3–10.6)
CHLORIDE BLD-SCNC: 104 MMOL/L (ref 99–110)
CO2: 21 MMOL/L (ref 21–32)
CREAT SERPL-MCNC: 1 MG/DL (ref 0.6–1.2)
EOSINOPHILS ABSOLUTE: 0.1 K/UL (ref 0–0.6)
EOSINOPHILS RELATIVE PERCENT: 2.5 %
GFR AFRICAN AMERICAN: >60
GFR NON-AFRICAN AMERICAN: 53
GLUCOSE BLD-MCNC: 96 MG/DL (ref 70–99)
HCT VFR BLD CALC: 34.1 % (ref 36–48)
HEMOGLOBIN: 11.6 G/DL (ref 12–16)
LYMPHOCYTES ABSOLUTE: 1 K/UL (ref 1–5.1)
LYMPHOCYTES RELATIVE PERCENT: 20.4 %
MAGNESIUM: 1.8 MG/DL (ref 1.8–2.4)
MCH RBC QN AUTO: 30.1 PG (ref 26–34)
MCHC RBC AUTO-ENTMCNC: 34.1 G/DL (ref 31–36)
MCV RBC AUTO: 88.4 FL (ref 80–100)
MONOCYTES ABSOLUTE: 0.6 K/UL (ref 0–1.3)
MONOCYTES RELATIVE PERCENT: 11.7 %
NEUTROPHILS ABSOLUTE: 3.2 K/UL (ref 1.7–7.7)
NEUTROPHILS RELATIVE PERCENT: 64.9 %
PDW BLD-RTO: 15.4 % (ref 12.4–15.4)
PLATELET # BLD: 171 K/UL (ref 135–450)
PMV BLD AUTO: 7.4 FL (ref 5–10.5)
POTASSIUM SERPL-SCNC: 3.7 MMOL/L (ref 3.5–5.1)
RBC # BLD: 3.86 M/UL (ref 4–5.2)
SODIUM BLD-SCNC: 138 MMOL/L (ref 136–145)
WBC # BLD: 5 K/UL (ref 4–11)

## 2022-03-24 PROCEDURE — 97530 THERAPEUTIC ACTIVITIES: CPT

## 2022-03-24 PROCEDURE — 85025 COMPLETE CBC W/AUTO DIFF WBC: CPT

## 2022-03-24 PROCEDURE — 36415 COLL VENOUS BLD VENIPUNCTURE: CPT

## 2022-03-24 PROCEDURE — 80048 BASIC METABOLIC PNL TOTAL CA: CPT

## 2022-03-24 PROCEDURE — 51798 US URINE CAPACITY MEASURE: CPT

## 2022-03-24 PROCEDURE — 97535 SELF CARE MNGMENT TRAINING: CPT

## 2022-03-24 PROCEDURE — 6370000000 HC RX 637 (ALT 250 FOR IP): Performed by: STUDENT IN AN ORGANIZED HEALTH CARE EDUCATION/TRAINING PROGRAM

## 2022-03-24 PROCEDURE — 6370000000 HC RX 637 (ALT 250 FOR IP): Performed by: HOSPITALIST

## 2022-03-24 PROCEDURE — 6370000000 HC RX 637 (ALT 250 FOR IP): Performed by: INTERNAL MEDICINE

## 2022-03-24 PROCEDURE — 6370000000 HC RX 637 (ALT 250 FOR IP): Performed by: NURSE PRACTITIONER

## 2022-03-24 PROCEDURE — 94760 N-INVAS EAR/PLS OXIMETRY 1: CPT

## 2022-03-24 PROCEDURE — 83735 ASSAY OF MAGNESIUM: CPT

## 2022-03-24 RX ORDER — TAMSULOSIN HYDROCHLORIDE 0.4 MG/1
0.4 CAPSULE ORAL DAILY
Qty: 30 CAPSULE | Refills: 3 | Status: SHIPPED | OUTPATIENT
Start: 2022-03-25

## 2022-03-24 RX ORDER — ASPIRIN 81 MG/1
81 TABLET ORAL DAILY
Qty: 30 TABLET | Refills: 3 | Status: SHIPPED | OUTPATIENT
Start: 2022-03-25

## 2022-03-24 RX ORDER — HYDRALAZINE HYDROCHLORIDE 25 MG/1
25 TABLET, FILM COATED ORAL ONCE
Status: COMPLETED | OUTPATIENT
Start: 2022-03-24 | End: 2022-03-24

## 2022-03-24 RX ADMIN — ACETAMINOPHEN 750 MG: 500 TABLET ORAL at 11:35

## 2022-03-24 RX ADMIN — CARVEDILOL 12.5 MG: 12.5 TABLET, FILM COATED ORAL at 08:37

## 2022-03-24 RX ADMIN — TAMSULOSIN HYDROCHLORIDE 0.4 MG: 0.4 CAPSULE ORAL at 08:36

## 2022-03-24 RX ADMIN — Medication 1000 UNITS: at 14:57

## 2022-03-24 RX ADMIN — ACETAMINOPHEN 750 MG: 500 TABLET ORAL at 04:18

## 2022-03-24 RX ADMIN — Medication 1 TABLET: at 08:36

## 2022-03-24 RX ADMIN — Medication 1000 UNITS: at 08:36

## 2022-03-24 RX ADMIN — ASPIRIN 81 MG: 81 TABLET, COATED ORAL at 08:36

## 2022-03-24 RX ADMIN — HYDRALAZINE HYDROCHLORIDE 25 MG: 25 TABLET, FILM COATED ORAL at 04:18

## 2022-03-24 RX ADMIN — BUDESONIDE 3 MG: 3 CAPSULE, GELATIN COATED ORAL at 09:06

## 2022-03-24 RX ADMIN — FERROUS SULFATE TAB EC 324 MG (65 MG FE EQUIVALENT) 325 MG: 324 (65 FE) TABLET DELAYED RESPONSE at 08:36

## 2022-03-24 RX ADMIN — RIVAROXABAN 15 MG: 15 TABLET, FILM COATED ORAL at 08:36

## 2022-03-24 ASSESSMENT — PAIN SCALES - GENERAL
PAINLEVEL_OUTOF10: 0
PAINLEVEL_OUTOF10: 0
PAINLEVEL_OUTOF10: 1

## 2022-03-24 NOTE — DISCHARGE SUMMARY
[facility] [place]   Discharge Summary    Patient:  Joceline Caro  YOB: 1935    MRN: [id number]   Acct: [de-identified]    Primary Care Physician: Dang Paula MD    Admit date:  3/16/2022    Discharge date:   3/24/22      Discharge Diagnoses:   AMS  Fever  Procalcitonin elevation  UCHE  Urinary retention           Admitted for: (HPI) exp aphasia     Hospital Course:  87f with afib, HTN, HLD, recent lacrimal stent removal after which she was noted to have increased confusion and complaint of a headache. Work up for cva was negative. She was also febrile and found to have an elevated procal with no obvious infection. Mentation has improved, however on 3/20, she was found to have urinary retention of 1 L with UCHE. Jorge was d/c's she was started on flomax without significant post void volumes noted.     Consultants:  ID - Dr. Rishabh Mckinney ; Urology - Dr. Debbie Zaragoza    Discharge Medications:       Medication List      START taking these medications    aspirin 81 MG EC tablet  Take 1 tablet by mouth daily  Start taking on: March 25, 2022     tamsulosin 0.4 MG capsule  Commonly known as: FLOMAX  Take 1 capsule by mouth daily  Start taking on: March 25, 2022        CONTINUE taking these medications    acetaminophen 500 MG tablet  Commonly known as: TYLENOL     atorvastatin 20 MG tablet  Commonly known as: LIPITOR     calcium carbonate 500 MG chewable tablet  Commonly known as: TUMS     carvedilol 12.5 MG tablet  Commonly known as: COREG     cetirizine 10 MG tablet  Commonly known as: ZYRTEC     Cyanocobalamin 5000 MCG Tbdp     Entocort EC 3 MG extended release capsule  Generic drug: budesonide     ferrous sulfate 325 (65 Fe) MG tablet  Commonly known as: IRON 325     Fosamax 70 MG tablet  Generic drug: alendronate     losartan 50 MG tablet  Commonly known as: COZAAR     therapeutic multivitamin-minerals tablet     vitamin D 25 MCG (1000 UT) Caps     Xarelto 15 MG Tabs tablet  Generic drug: rivaroxaban           Where to Get Your Medications      These medications were sent to [address] [facility] [address], [address] - F [phone] [address], [address]    Phone: [phone]   · aspirin 81 MG EC tablet  · tamsulosin 0.4 MG capsule           Physical Exam:    Vitals:  Vitals:    03/24/22 0727 03/24/22 0830 03/24/22 1009 03/24/22 1130   BP: (!) 151/88   (!) 127/90   Pulse: 72   69   Resp: 15 26  20   Temp: 97.8 °F (36.6 °C)   97.4 °F (36.3 °C)   TempSrc: Oral   Oral   SpO2: 97% 98%  99%   Weight:       Height:   5' 2\" (1.575 m)      Weight: Weight: 139 lb 1.8 oz (63.1 kg)     24 hour intake/output:    Intake/Output Summary (Last 24 hours) at 3/24/2022 1158  Last data filed at 3/24/2022 1152  Gross per 24 hour   Intake 1030 ml   Output 2250 ml   Net -1220 ml       General appearance - alert, well appearing, and in no distress  Chest - clear to auscultation, no wheezes, rales or rhonchi, symmetric air entry  Heart - normal rate, regular rhythm, normal S1, S2, no murmurs, rubs, clicks or gallops  Abdomen - soft, nontender, nondistended, no masses or organomegaly  Obese: No; Protuberant: No   Neurological - alert, oriented, normal speech, no focal findings or movement disorder noted  Extremities - peripheral pulses normal, no pedal edema, no clubbing or cyanosis  Skin - normal coloration and turgor    Radiology reports as per the Radiologist  Radiology: CT HEAD WO CONTRAST    Result Date: 3/16/2022  EXAMINATION: CT OF THE HEAD WITHOUT CONTRAST  3/16/2022 10:21 pm TECHNIQUE: CT of the head was performed without the administration of intravenous contrast. Dose modulation, iterative reconstruction, and/or weight based adjustment of the mA/kV was utilized to reduce the radiation dose to as low as reasonably achievable. COMPARISON: None.  HISTORY: ORDERING SYSTEM PROVIDED HISTORY: Fall TECHNOLOGIST PROVIDED HISTORY: Has a \"code stroke\" or \"stroke alert\" been called? ->No Reason for exam:->Fall Decision Support Exception - unselect if not a suspected or confirmed emergency medical condition->Emergency Medical Condition (MA) Reason for Exam: Fall FINDINGS: BRAIN/VENTRICLES: There is no acute intracranial hemorrhage, mass effect or midline shift. No abnormal extra-axial fluid collection. The gray-white differentiation is maintained without evidence of an acute infarct. There is no evidence of hydrocephalus. Symmetric mild physiological calcification is seen within the basal ganglia. Ill-defined hypoattenuation is present within cerebral white matter consistent with mild microvascular ischemic disease. ORBITS: The visualized portion of the orbits demonstrate no acute abnormality. SINUSES: The visualized paranasal sinuses and mastoid air cells demonstrate no acute abnormality. SOFT TISSUES/SKULL: Soft tissue volume loss at the left frontal scalp is noted. No acute abnormality of the visualized skull or soft tissues. 1. No acute intracranial abnormality. 2. Mild cerebral white matter chronic microvascular ischemic disease. 3. Soft tissue volume loss involving the left frontal scalp suggesting association with scarring. Recommend clinical correlation. MRI brain without contrast    Result Date: 3/17/2022  EXAMINATION: MRI OF THE BRAIN WITHOUT CONTRAST  3/17/2022 12:41 pm TECHNIQUE: Multiplanar multisequence MRI of the brain was performed without the administration of intravenous contrast. COMPARISON: None. HISTORY: ORDERING SYSTEM PROVIDED HISTORY: expressive aphasia TECHNOLOGIST PROVIDED HISTORY: Reason for exam:->expressive aphasia Decision Support Exception - unselect if not a suspected or confirmed emergency medical condition->Emergency Medical Condition (MA) Reason for Exam: expressive aphasia Initial evaluation. FINDINGS: INTRACRANIAL STRUCTURES/VENTRICLES: There is no acute infarct. No mass effect or midline shift.  No evidence of an acute intracranial hemorrhage. Areas of T2 FLAIR hyperintensity are seen in the periventricular and subcortical white matter, which are nonspecific, but may represent chronic microvascular ischemic change. There is minimal global parenchymal volume loss. Otherwise, the ventricles and sulci are normal in size and configuration. The sellar/suprasellar regions appear unremarkable. The normal signal voids within the major intracranial vessels appear maintained. ORBITS: The visualized portion of the orbits demonstrate no acute abnormality. SINUSES: There is fluid within the right sphenoid sinus. The mastoid air cells demonstrate no acute abnormality. BONES/SOFT TISSUES: The bone marrow signal intensity appears normal. The soft tissues demonstrate no acute abnormality. 1. No acute intracranial abnormality. No acute infarct. 2. Minimal global parenchymal volume loss with mild chronic microvascular ischemic changes. 3. Fluid is seen in the right sphenoid sinus. Results for orders placed or performed during the hospital encounter of 03/16/22   Culture, Blood 1    Specimen: Blood   Result Value Ref Range    Blood Culture, Routine No Growth after 4 days of incubation. Culture, Blood 2    Specimen: Blood   Result Value Ref Range    Culture, Blood 2 No Growth after 4 days of incubation.     Respiratory Panel, Molecular, with COVID-19 (Restricted: peds pts or suitable admitted adults)    Specimen: Nasopharyngeal; Respiratory   Result Value Ref Range    Respiratory Panel PCR       Respiratory Pathogens Panel PCR Result: Not Detected  See additional report for complete Respiratory Pathogens Panel     Culture, Urine    Specimen: Urine, clean catch   Result Value Ref Range    Urine Culture, Routine No growth at 18 to 36 hours    Respiratory Panel Film Array Report   Result Value Ref Range    Report SEE IMAGE    CBC with Auto Differential   Result Value Ref Range    WBC 7.0 4.0 - 11.0 K/uL    RBC 4.52 4.00 - 5.20 M/uL Hemoglobin 13.5 12.0 - 16.0 g/dL    Hematocrit 40.6 36.0 - 48.0 %    MCV 89.8 80.0 - 100.0 fL    MCH 29.9 26.0 - 34.0 pg    MCHC 33.3 31.0 - 36.0 g/dL    RDW 15.6 (H) 12.4 - 15.4 %    Platelets 072 697 - 978 K/uL    MPV 7.4 5.0 - 10.5 fL    Neutrophils % 79.5 %    Lymphocytes % 10.3 %    Monocytes % 9.7 %    Eosinophils % 0.2 %    Basophils % 0.3 %    Neutrophils Absolute 5.6 1.7 - 7.7 K/uL    Lymphocytes Absolute 0.7 (L) 1.0 - 5.1 K/uL    Monocytes Absolute 0.7 0.0 - 1.3 K/uL    Eosinophils Absolute 0.0 0.0 - 0.6 K/uL    Basophils Absolute 0.0 0.0 - 0.2 K/uL   Basic Metabolic Panel w/ Reflex to MG   Result Value Ref Range    Sodium 135 (L) 136 - 145 mmol/L    Potassium reflex Magnesium 3.8 3.5 - 5.1 mmol/L    Chloride 99 99 - 110 mmol/L    CO2 20 (L) 21 - 32 mmol/L    Anion Gap 16 3 - 16    Glucose 125 (H) 70 - 99 mg/dL    BUN 16 7 - 20 mg/dL    CREATININE 0.8 0.6 - 1.2 mg/dL    GFR Non-African American >60 >60    GFR African American >60 >60    Calcium 9.1 8.3 - 10.6 mg/dL   Ammonia   Result Value Ref Range    Ammonia 23 11 - 51 umol/L   TSH with Reflex   Result Value Ref Range    TSH 0.39 0.27 - 4.20 uIU/mL   CBC   Result Value Ref Range    WBC 7.7 4.0 - 11.0 K/uL    RBC 4.47 4.00 - 5.20 M/uL    Hemoglobin 13.3 12.0 - 16.0 g/dL    Hematocrit 40.4 36.0 - 48.0 %    MCV 90.4 80.0 - 100.0 fL    MCH 29.9 26.0 - 34.0 pg    MCHC 33.0 31.0 - 36.0 g/dL    RDW 15.5 (H) 12.4 - 15.4 %    Platelets 607 779 - 631 K/uL    MPV 7.5 5.0 - 10.5 fL   Hemoglobin A1c   Result Value Ref Range    Hemoglobin A1C 5.9 See comment %    eAG 122.6 mg/dL   Lipid panel - fasting   Result Value Ref Range    Cholesterol, Total 159 0 - 199 mg/dL    Triglycerides 71 0 - 150 mg/dL    HDL 68 (H) 40 - 60 mg/dL    LDL Calculated 77 <100 mg/dL    VLDL Cholesterol Calculated 14 Not Established mg/dL   Basic Metabolic Panel w/ Reflex to MG   Result Value Ref Range    Sodium 134 (L) 136 - 145 mmol/L    Potassium reflex Magnesium 3.6 3.5 - 5.1 mmol/L Chloride 99 99 - 110 mmol/L    CO2 18 (L) 21 - 32 mmol/L    Anion Gap 17 (H) 3 - 16    Glucose 102 (H) 70 - 99 mg/dL    BUN 14 7 - 20 mg/dL    CREATININE 0.6 0.6 - 1.2 mg/dL    GFR Non-African American >60 >60    GFR African American >60 >60    Calcium 8.7 8.3 - 10.6 mg/dL   Basic Metabolic Panel   Result Value Ref Range    Sodium 137 136 - 145 mmol/L    Potassium 3.7 3.5 - 5.1 mmol/L    Chloride 101 99 - 110 mmol/L    CO2 20 (L) 21 - 32 mmol/L    Anion Gap 16 3 - 16    Glucose 105 (H) 70 - 99 mg/dL    BUN 15 7 - 20 mg/dL    CREATININE 0.7 0.6 - 1.2 mg/dL    GFR Non-African American >60 >60    GFR African American >60 >60    Calcium 8.5 8.3 - 10.6 mg/dL   CBC with Auto Differential   Result Value Ref Range    WBC 6.9 4.0 - 11.0 K/uL    RBC 4.31 4.00 - 5.20 M/uL    Hemoglobin 13.0 12.0 - 16.0 g/dL    Hematocrit 38.3 36.0 - 48.0 %    MCV 88.9 80.0 - 100.0 fL    MCH 30.2 26.0 - 34.0 pg    MCHC 34.0 31.0 - 36.0 g/dL    RDW 15.3 12.4 - 15.4 %    Platelets 568 601 - 920 K/uL    MPV 7.7 5.0 - 10.5 fL    Neutrophils % 67.6 %    Lymphocytes % 18.4 %    Monocytes % 13.6 %    Eosinophils % 0.2 %    Basophils % 0.2 %    Neutrophils Absolute 4.7 1.7 - 7.7 K/uL    Lymphocytes Absolute 1.3 1.0 - 5.1 K/uL    Monocytes Absolute 0.9 0.0 - 1.3 K/uL    Eosinophils Absolute 0.0 0.0 - 0.6 K/uL    Basophils Absolute 0.0 0.0 - 0.2 K/uL   Procalcitonin   Result Value Ref Range    Procalcitonin 13.38 (H) 0.00 - 0.15 ng/mL   Sedimentation Rate   Result Value Ref Range    Sed Rate 25 0 - 30 mm/Hr   C-Reactive Protein   Result Value Ref Range    CRP <3.0 0.0 - 5.1 mg/L   Urinalysis   Result Value Ref Range    Color, UA YELLOW Straw/Yellow    Clarity, UA Clear Clear    Glucose, Ur Negative Negative mg/dL    Bilirubin Urine Negative Negative    Ketones, Urine 15 (A) Negative mg/dL    Specific Gravity, UA 1.019 1.005 - 1.030    Blood, Urine SMALL (A) Negative    pH, UA 6.0 5.0 - 8.0    Protein, UA TRACE (A) Negative mg/dL    Urobilinogen, Urine 0. 2 <2.0 E.U./dL    Nitrite, Urine Negative Negative    Leukocyte Esterase, Urine Negative Negative    Microscopic Examination YES     Urine Type NotGiven    Microscopic Urinalysis   Result Value Ref Range    Hyaline Casts, UA 1 0 - 8 /LPF    WBC, UA 4 0 - 5 /HPF    RBC, UA 13 (H) 0 - 4 /HPF    Epithelial Cells, UA 1 0 - 5 /HPF   Vancomycin Level, Random   Result Value Ref Range    Vancomycin Rm 6.8 ug/mL   Basic Metabolic Panel   Result Value Ref Range    Sodium 131 (L) 136 - 145 mmol/L    Potassium 3.2 (L) 3.5 - 5.1 mmol/L    Chloride 98 (L) 99 - 110 mmol/L    CO2 20 (L) 21 - 32 mmol/L    Anion Gap 13 3 - 16    Glucose 101 (H) 70 - 99 mg/dL    BUN 16 7 - 20 mg/dL    CREATININE 0.6 0.6 - 1.2 mg/dL    GFR Non-African American >60 >60    GFR African American >60 >60    Calcium 8.4 8.3 - 10.6 mg/dL   CBC with Auto Differential   Result Value Ref Range    WBC 6.5 4.0 - 11.0 K/uL    RBC 4.42 4.00 - 5.20 M/uL    Hemoglobin 13.1 12.0 - 16.0 g/dL    Hematocrit 39.2 36.0 - 48.0 %    MCV 88.6 80.0 - 100.0 fL    MCH 29.7 26.0 - 34.0 pg    MCHC 33.5 31.0 - 36.0 g/dL    RDW 15.4 12.4 - 15.4 %    Platelets 200 440 - 580 K/uL    MPV 7.6 5.0 - 10.5 fL    Neutrophils % 67.3 %    Lymphocytes % 17.5 %    Monocytes % 13.0 %    Eosinophils % 1.8 %    Basophils % 0.4 %    Neutrophils Absolute 4.4 1.7 - 7.7 K/uL    Lymphocytes Absolute 1.1 1.0 - 5.1 K/uL    Monocytes Absolute 0.8 0.0 - 1.3 K/uL    Eosinophils Absolute 0.1 0.0 - 0.6 K/uL    Basophils Absolute 0.0 0.0 - 0.2 K/uL   Vancomycin Level, Random   Result Value Ref Range    Vancomycin Rm 9.5 ug/mL   Procalcitonin   Result Value Ref Range    Procalcitonin 6.88 (H) 0.00 - 0.15 ng/mL   Basic Metabolic Panel   Result Value Ref Range    Sodium 132 (L) 136 - 145 mmol/L    Potassium 3.5 3.5 - 5.1 mmol/L    Chloride 99 99 - 110 mmol/L    CO2 20 (L) 21 - 32 mmol/L    Anion Gap 13 3 - 16    Glucose 104 (H) 70 - 99 mg/dL    BUN 27 (H) 7 - 20 mg/dL    CREATININE 1.8 (H) 0.6 - 1.2 mg/dL    GFR Non- 27 (A) >60    GFR  32 (A) >60    Calcium 8.7 8.3 - 10.6 mg/dL   CBC with Auto Differential   Result Value Ref Range    WBC 6.5 4.0 - 11.0 K/uL    RBC 4.30 4.00 - 5.20 M/uL    Hemoglobin 12.9 12.0 - 16.0 g/dL    Hematocrit 38.1 36.0 - 48.0 %    MCV 88.6 80.0 - 100.0 fL    MCH 29.9 26.0 - 34.0 pg    MCHC 33.7 31.0 - 36.0 g/dL    RDW 15.7 (H) 12.4 - 15.4 %    Platelets 105 325 - 719 K/uL    MPV 7.7 5.0 - 10.5 fL    Neutrophils % 74.5 %    Lymphocytes % 11.0 %    Monocytes % 11.4 %    Eosinophils % 2.6 %    Basophils % 0.5 %    Neutrophils Absolute 4.8 1.7 - 7.7 K/uL    Lymphocytes Absolute 0.7 (L) 1.0 - 5.1 K/uL    Monocytes Absolute 0.7 0.0 - 1.3 K/uL    Eosinophils Absolute 0.2 0.0 - 0.6 K/uL    Basophils Absolute 0.0 0.0 - 0.2 K/uL   Vancomycin Level, Random   Result Value Ref Range    Vancomycin Rm 18.9 ug/mL   Procalcitonin   Result Value Ref Range    Procalcitonin 7.10 (H) 0.00 - 0.15 ng/mL   Basic Metabolic Panel   Result Value Ref Range    Sodium 137 136 - 145 mmol/L    Potassium 3.8 3.5 - 5.1 mmol/L    Chloride 103 99 - 110 mmol/L    CO2 22 21 - 32 mmol/L    Anion Gap 12 3 - 16    Glucose 93 70 - 99 mg/dL    BUN 21 (H) 7 - 20 mg/dL    CREATININE 1.4 (H) 0.6 - 1.2 mg/dL    GFR Non- 36 (A) >60    GFR  43 (A) >60    Calcium 8.9 8.3 - 10.6 mg/dL   CBC with Auto Differential   Result Value Ref Range    WBC 5.9 4.0 - 11.0 K/uL    RBC 4.21 4.00 - 5.20 M/uL    Hemoglobin 12.7 12.0 - 16.0 g/dL    Hematocrit 37.4 36.0 - 48.0 %    MCV 88.7 80.0 - 100.0 fL    MCH 30.2 26.0 - 34.0 pg    MCHC 34.0 31.0 - 36.0 g/dL    RDW 15.5 (H) 12.4 - 15.4 %    Platelets 125 904 - 538 K/uL    MPV 7.4 5.0 - 10.5 fL    Neutrophils % 73.1 %    Lymphocytes % 14.1 %    Monocytes % 10.1 %    Eosinophils % 2.4 %    Basophils % 0.3 %    Neutrophils Absolute 4.3 1.7 - 7.7 K/uL    Lymphocytes Absolute 0.8 (L) 1.0 - 5.1 K/uL    Monocytes Absolute 0.6 0.0 - 1.3 K/uL    Eosinophils Absolute 0.1 0.0 - 0.6 K/uL    Basophils Absolute 0.0 0.0 - 0.2 K/uL   Vancomycin Level, Random   Result Value Ref Range    Vancomycin Rm 11.3 ug/mL   Hepatic Function Panel   Result Value Ref Range    Total Protein 5.6 (L) 6.4 - 8.2 g/dL    Albumin 3.6 3.4 - 5.0 g/dL    Alkaline Phosphatase 81 40 - 129 U/L    ALT 20 10 - 40 U/L    AST 30 15 - 37 U/L    Total Bilirubin 0.4 0.0 - 1.0 mg/dL    Bilirubin, Direct <0.2 0.0 - 0.3 mg/dL    Bilirubin, Indirect see below 0.0 - 1.0 mg/dL   Ammonia   Result Value Ref Range    Ammonia 18 11 - 51 umol/L   Basic Metabolic Panel   Result Value Ref Range    Sodium 139 136 - 145 mmol/L    Potassium 3.5 3.5 - 5.1 mmol/L    Chloride 104 99 - 110 mmol/L    CO2 22 21 - 32 mmol/L    Anion Gap 13 3 - 16    Glucose 95 70 - 99 mg/dL    BUN 22 (H) 7 - 20 mg/dL    CREATININE 1.3 (H) 0.6 - 1.2 mg/dL    GFR Non-African American 39 (A) >60    GFR  47 (A) >60    Calcium 9.1 8.3 - 10.6 mg/dL   CBC with Auto Differential   Result Value Ref Range    WBC 5.2 4.0 - 11.0 K/uL    RBC 4.09 4.00 - 5.20 M/uL    Hemoglobin 12.3 12.0 - 16.0 g/dL    Hematocrit 36.3 36.0 - 48.0 %    MCV 88.8 80.0 - 100.0 fL    MCH 30.0 26.0 - 34.0 pg    MCHC 33.8 31.0 - 36.0 g/dL    RDW 15.6 (H) 12.4 - 15.4 %    Platelets 736 550 - 529 K/uL    MPV 7.3 5.0 - 10.5 fL    Neutrophils % 66.9 %    Lymphocytes % 21.3 %    Monocytes % 9.1 %    Eosinophils % 2.2 %    Basophils % 0.5 %    Neutrophils Absolute 3.5 1.7 - 7.7 K/uL    Lymphocytes Absolute 1.1 1.0 - 5.1 K/uL    Monocytes Absolute 0.5 0.0 - 1.3 K/uL    Eosinophils Absolute 0.1 0.0 - 0.6 K/uL    Basophils Absolute 0.0 0.0 - 0.2 K/uL   Basic Metabolic Panel   Result Value Ref Range    Sodium 136 136 - 145 mmol/L    Potassium 3.9 3.5 - 5.1 mmol/L    Chloride 104 99 - 110 mmol/L    CO2 21 21 - 32 mmol/L    Anion Gap 11 3 - 16    Glucose 98 70 - 99 mg/dL    BUN 23 (H) 7 - 20 mg/dL    CREATININE 1.1 0.6 - 1.2 mg/dL    GFR Non- 47 (A) >60    GFR  57 (A) >60    Calcium 9.0 8.3 - 10.6 mg/dL   CBC with Auto Differential   Result Value Ref Range    WBC 5.4 4.0 - 11.0 K/uL    RBC 3.92 (L) 4.00 - 5.20 M/uL    Hemoglobin 11.8 (L) 12.0 - 16.0 g/dL    Hematocrit 34.8 (L) 36.0 - 48.0 %    MCV 88.8 80.0 - 100.0 fL    MCH 30.1 26.0 - 34.0 pg    MCHC 33.9 31.0 - 36.0 g/dL    RDW 15.4 12.4 - 15.4 %    Platelets 099 743 - 904 K/uL    MPV 7.5 5.0 - 10.5 fL    Neutrophils % 70.7 %    Lymphocytes % 17.0 %    Monocytes % 9.8 %    Eosinophils % 1.9 %    Basophils % 0.6 %    Neutrophils Absolute 3.8 1.7 - 7.7 K/uL    Lymphocytes Absolute 0.9 (L) 1.0 - 5.1 K/uL    Monocytes Absolute 0.5 0.0 - 1.3 K/uL    Eosinophils Absolute 0.1 0.0 - 0.6 K/uL    Basophils Absolute 0.0 0.0 - 0.2 K/uL   Basic Metabolic Panel   Result Value Ref Range    Sodium 138 136 - 145 mmol/L    Potassium 3.7 3.5 - 5.1 mmol/L    Chloride 104 99 - 110 mmol/L    CO2 21 21 - 32 mmol/L    Anion Gap 13 3 - 16    Glucose 96 70 - 99 mg/dL    BUN 21 (H) 7 - 20 mg/dL    CREATININE 1.0 0.6 - 1.2 mg/dL    GFR Non- 53 (A) >60    GFR African American >60 >60    Calcium 9.1 8.3 - 10.6 mg/dL   CBC with Auto Differential   Result Value Ref Range    WBC 5.0 4.0 - 11.0 K/uL    RBC 3.86 (L) 4.00 - 5.20 M/uL    Hemoglobin 11.6 (L) 12.0 - 16.0 g/dL    Hematocrit 34.1 (L) 36.0 - 48.0 %    MCV 88.4 80.0 - 100.0 fL    MCH 30.1 26.0 - 34.0 pg    MCHC 34.1 31.0 - 36.0 g/dL    RDW 15.4 12.4 - 15.4 %    Platelets 374 637 - 139 K/uL    MPV 7.4 5.0 - 10.5 fL    Neutrophils % 64.9 %    Lymphocytes % 20.4 %    Monocytes % 11.7 %    Eosinophils % 2.5 %    Basophils % 0.5 %    Neutrophils Absolute 3.2 1.7 - 7.7 K/uL    Lymphocytes Absolute 1.0 1.0 - 5.1 K/uL    Monocytes Absolute 0.6 0.0 - 1.3 K/uL    Eosinophils Absolute 0.1 0.0 - 0.6 K/uL    Basophils Absolute 0.0 0.0 - 0.2 K/uL   Magnesium   Result Value Ref Range    Magnesium 1.80 1.80 - 2.40 mg/dL       Diet:  ADULT DIET;  Regular  ADULT ORAL NUTRITION SUPPLEMENT; Breakfast, Dinner; Standard High Calorie/High Protein Oral Supplement    Activity:  Activity as tolerated (Patient may move about with assist as indicated or with supervision.)    Follow-up:  in the next few days with Pao Tran MD     Disposition: Home    Condition: Stable      Time Spent: 35 minutes    Electronically signed by Wil Farias MD on 3/24/2022 at 11:58 AM    Discharging Hospitalist

## 2022-03-24 NOTE — PROGRESS NOTES
Occupational Therapy  Facility/Department: [address] PROGRESSIVE CARE  Daily Treatment Note and Tentative D/C    NAME: Jamey Fletcher  : 1935  MRN: [id number]    Date of Service: 3/24/2022    Discharge Recommendations: Jamey Fletcher scored a 21/24 on the AM-PAC ADL Inpatient form. Current research shows that an AM-PAC score of 18 or greater is typically associated with a discharge to the patient's home setting. If patient discharges prior to next session this note will serve as a discharge summary. Please see below for the latest assessment towards goals. Continue to assess pending progress,Home with assist PRN  OT Equipment Recommendations  Equipment Needed: Yes  Walker: Rolling    Assessment   Performance deficits / Impairments: Decreased functional mobility ; Decreased strength;Decreased endurance;Decreased ADL status; Decreased safe awareness;Decreased cognition;Decreased balance;Decreased high-level IADLs  Assessment: Pt tolerated session well. Pt completes functional mobility and transfers with supervision and use of RW. Pt with no LOB. Pt able to ambulate household and community distances in room and hallway. Pt completes toileting and grooming in stance at sink with supervision. Continue with POC. Prognosis: Good  Assistance / Modification: RW  OT Education: OT Role;Transfer Training;Plan of Care;ADL Adaptive Strategies  REQUIRES OT FOLLOW UP: Yes  Activity Tolerance  Activity Tolerance: Patient Tolerated treatment well  Safety Devices  Safety Devices in place: Yes  Type of devices: Left in chair;Call light within reach;Nurse notified         Patient Diagnosis(es): The encounter diagnosis was Stroke-like symptoms. has a past medical history of Atrial fibrillation ([facility] [address] has no past surgical history on file.     Restrictions  Restrictions/Precautions  Restrictions/Precautions: Fall Risk  Subjective   General  Chart Reviewed: Yes  Patient assessed for rehabilitation services?: Yes  Additional Pertinent Hx: per CNP note, 82 y.o. female with PMHx of A-fib, TIA, HTN, HLD and recent stent removal from lacrimal duct presented to [facility] with confusion and 3 separate episodes of  expressive aphasia in the last 2 days. Patient was seen at [address] twice in that timeframe and discharged home both times after neg workup with CT head without contrast and CTA head and neck. Symptoms returned again tonight. She is now weak and almost fell tonight while trying to stand and walk. Family immediately called 911 instead of having her try and stand again. She denies unilateral weakness or paresthesias. She does feel like it is difficult to get the right words out but family has not noticed any slurring of her words. No facial droop. Family / Caregiver Present: Yes (daughter)  Referring Practitioner: SHIRLEY Mendez CNP  Subjective  Subjective: Pt agreeable to OT treatment. Pt with no reports of pain. Orientation  Orientation  Overall Orientation Status: Within Functional Limits  Orientation Level: Oriented X4  Objective    ADL  Grooming: Supervision (supervision for balance; wash hands at sink)  Toileting: Supervision (supervision for balance; pt able to manage pants and complete pericare)        Balance  Sitting Balance: Independent  Standing Balance: Supervision (RW)  Functional Mobility  Functional - Mobility Device: Rolling Walker  Activity: Other; To/from bathroom (~25ft, 1200ft)  Assist Level: Supervision  Functional Mobility Comments: no LOB; no reports of light headedness/dizziness  Toilet Transfers  Toilet - Technique: Ambulating (RW)  Equipment Used: Grab bars  Toilet Transfer: Supervision  Wheelchair [employer] Transfers  Wheelchair/Bed - Technique: Ambulating (RW)  Equipment Used:  Other (chair)  Level of Asssistance: Supervision  Bed mobility  Supine to Sit: Unable to assess  Sit to Supine: Unable to assess  Scooting: Unable to assess  Transfers  Sit to stand: Supervision  Stand to sit: Supervision  Transfer Comments: to/from RW              Cognition  Overall Cognitive Status: [facility]           Plan   Plan  Times per week: 3-5x  Current Treatment Recommendations: Strengthening,Functional Mobility Training,Gait Training,Endurance Training,Balance Training,Safety Education & Training,Self-Care / ADL,Patient/Caregiver Education & Training    AM-PAC Score        AM-PAC Inpatient Daily Activity Raw Score: 21 (03/24/22 1035)  AM-PAC Inpatient ADL T-Scale Score : 44.27 (03/24/22 1035)  ADL Inpatient CMS 0-100% Score: 32.79 (03/24/22 1035)  ADL Inpatient CMS G-Code Modifier : Flo Caterina (03/24/22 1035)    Goals  Short term goals  Time Frame for Short term goals: prior to D/C; Status:  ongoing 3/24  Short term goal 1: complete functional mobility and transfers Mod Ind  Short term goal 2: complete bathing and dressing Mod Ind  Short term goal 3: complete toileting Mod Ind  Short term goal 4: complete grooming in stance at sink [address] term goals  Time Frame for Long term goals : STG=LTG  Patient Goals   Patient goals : return to PLOF       Therapy Time   Individual Concurrent Group Co-treatment   Time In 1000         Time Out 1038         Minutes 38         Timed Code Treatment Minutes: 38 Minutes       Wale Ground, OTR/L

## 2022-03-24 NOTE — PROGRESS NOTES
Patient's BP is 177/76 and all other vitals are WNL. Pt does not complain of a headache at this time. MD notified of patient's BP. Malachi Trotter Awaiting response back.  Electronically signed by Ary Ruiz RN on 3/24/2022 at 4:05 AM

## 2022-03-24 NOTE — PROGRESS NOTES
Pt discharged to home with home care. Meds delivered from pharmacy. LOS complete. Home walker delivered. Discharge education and documentation complete. All questions answered. Daughter taking patient home. All belongings sent with pt and daughter.

## 2022-03-24 NOTE — PROGRESS NOTES
Pt Name: Josias Mccloud  Medical Record Number: [id number]  Date of Birth 1935   Today's Date: 3/24/2022      Subjective:  Patient has been ambulating more and voiding spontaneously with low PVRs. Feels much better overall. Being discharged. ROS: Constitutional: No fever    Vitals:  Vitals:    03/24/22 0727 03/24/22 0830 03/24/22 1009 03/24/22 1130   BP: (!) 151/88   (!) 127/90   Pulse: 72   69   Resp: 15 26  20   Temp: 97.8 °F (36.6 °C)   97.4 °F (36.3 °C)   TempSrc: Oral   Oral   SpO2: 97% 98%  99%   Weight:       Height:   5' 2\" (1.575 m)      I/O last 3 completed shifts:   In: 830 [P.O.:830]  Out: 3275 [Urine:3275]    Exam:  General: Awake, oriented, no acute distress  Respiratory: Nonlabored breathing  Abdomen: Soft, non-tender, non-distended, no masses  : nonpalpable bladder  Skin: Skin color, texture, turgor normal, no rashes or lesions  Neurologic: no gross deficits    CURRENT MEDICATIONS   Scheduled Meds:   tamsulosin  0.4 mg Oral Daily    acetaminophen  750 mg Oral 4 times per day    rivaroxaban  15 mg Oral Daily with breakfast    budesonide  3 mg Oral QAM    Vitamin D  1,000 Units Oral TID    ferrous sulfate  325 mg Oral Daily with breakfast    therapeutic multivitamin-minerals  1 tablet Oral Daily    atorvastatin  80 mg Oral Nightly    aspirin  81 mg Oral Daily    Or    aspirin  300 mg Rectal Daily    carvedilol  12.5 mg Oral BID WC     Continuous Infusions:  PRN Meds:.cetirizine, calcium carbonate, ondansetron **OR** ondansetron, polyethylene glycol, perflutren lipid microspheres, labetalol    LABS     Recent Labs     03/21/22  1840 03/22/22  0533 03/23/22  0646 03/24/22  0530   WBC  --  5.2 5.4 5.0   HGB  --  12.3 11.8* 11.6*   HCT  --  36.3 34.8* 34.1*   PLT  --  167 167 171   NA  --  139 136 138   K  --  3.5 3.9 3.7   CL  --  104 104 104   CO2  --  22 21 21   BUN  --  22* 23* 21*   CREATININE  --  1.3* 1.1 1.0   MG  --   --   --  1.80   CALCIUM  --  9.1 9.0 9.1   AST 30 --   --   --    ALT 20  --   --   --    BILITOT 0.4  --   --   --    BILIDIR <0.2  --   --   --      Urine culture negative 3/18    ASSESSMENT   1. Hospital day # 9  2. 83 yo female with acute urinary retention which seems to have resolved  PLAN   1. OK for DC per   2. Follow up if voiding difficulties recur, particularly if she has urge to void and unable to  3.  She does not need to be discharged home with flomax     Elina Alberto MD 3/24/2022 12:39 PM

## 2022-03-24 NOTE — CARE COORDINATION
CASE MANAGEMENT DISCHARGE SUMMARY:    DISCHARGE DATE: 3/24/2022    DISCHARGED TO: home with family; home care; no other reported needs     HOME CARE AGENCY:  [facility]  · Address:  [address] [place] [address]   · Phone:  [phone]  · Fax:  [phone]  · [address] order and LOS sent via [employer]. DME: Order for Folding walker with wheels    Spoke to Bicske with [employer], confirmed they have information for patient and will supply DME. Notified RN.      TRANSPORTATION: Daughter              TIME: patient to coordinate with RN     PREFERRED PHARMACY: [facility] [place], MSW, [facility], Social Work/Case Management   [phone]  Electronically signed by MONSE Kinney, LSW on 3/24/2022 at 1:42 PM

## 2022-03-24 NOTE — PROGRESS NOTES
Comprehensive Nutrition Assessment    Type and Reason for Visit:  Initial    Nutrition Recommendations/Plan:   Regular diet   Ensure BID  Will monitor nutritional adequacy, nutrition-related labs, weights, BMs, and clinical progress     Nutrition Assessment:  LOS. Pt with hx of HTN, HLD, recent lacrimal stent removal. Pt presented with confusion and headache, workup for CVA negative. Being treated for AMS, UCHE, fever. Currently on Regular diet. Intake varied but mostly eating less than 50%. Limited wt hx to monitor trends. Will trial ONS. Malnutrition Assessment:  Malnutrition Status: At risk for malnutrition (Comment)    Context:  Acute Illness     Findings of the 6 clinical characteristics of malnutrition:  Energy Intake:  1 - 75% or less of estimated energy requirements for 7 or more days  Weight Loss:  No significant weight loss     Body Fat Loss:  No significant body fat loss     Muscle Mass Loss:  No significant muscle mass loss    Fluid Accumulation:  No significant fluid accumulation     Strength:  Not Performed    Estimated Daily Nutrient Needs:  Energy (kcal):  7213-6605 kcal (25-30 kcal/kg ABW); Weight Used for Energy Requirements:  Current     Protein (g):  63-76 gm (1-1.2 gm/kg ABW); Weight Used for Protein Requirements:  Current        Fluid (ml/day):   ; Method Used for Fluid Requirements:  1 ml/kcal      Nutrition Related Findings:  No edema      Wounds:  None       Current Nutrition Therapies:    ADULT DIET; Regular    Anthropometric Measures:  · Height: 5' 2\" (157.5 cm)  · Current Body Weight: 139 lb (63 kg)   · Admission Body Weight: 139 lb (63 kg)    · Ideal Body Weight: 110 lbs; % Ideal Body Weight 126.4 %   · BMI: 25.4  · Adjusted Body Weight:  ; No Adjustment   · BMI Categories: Overweight (BMI 25.0-29. 9)       Nutrition Diagnosis:   · Inadequate oral intake related to  (poor appetite) as evidenced by intake 26-50%      Nutrition Interventions:   Food and/or Nutrient Delivery: Continue Current Diet,Start Oral Nutrition Supplement  Nutrition Education/Counseling:  No recommendation at this time   Coordination of Nutrition Care:  Continue to monitor while inpatient    Goals:  Consume greater than 50% of meals and supplements this admission       Nutrition Monitoring and Evaluation:   Behavioral-Environmental Outcomes:  None Identified   Food/Nutrient Intake Outcomes:  Food and Nutrient Intake,Supplement Intake  Physical Signs/Symptoms Outcomes:  Biochemical Data,Nutrition Focused Physical Findings,Skin,Weight     Discharge Planning:    No discharge needs at this time     Electronically signed by Angelique Mcdonald RD, LD on 3/24/22 at 10:29 AM EDT    Contact: [phone]

## 2022-03-24 NOTE — PLAN OF CARE
Problem: Pain:  Description: Pain management should include both nonpharmacologic and pharmacologic interventions. Goal: Pain level will decrease  Description: Pain level will decrease  3/23/2022 2317 by Cem Collier RN  Outcome: Ongoing     Problem: Pain:  Description: Pain management should include both nonpharmacologic and pharmacologic interventions. Goal: Control of acute pain  Description: Control of acute pain  3/23/2022 2317 by Cem Collier RN  Outcome: Ongoing     Problem: Pain:  Description: Pain management should include both nonpharmacologic and pharmacologic interventions.   Goal: Control of chronic pain  Description: Control of chronic pain  Outcome: Ongoing     Problem: Falls - Risk of:  Goal: Will remain free from falls  Description: Will remain free from falls  3/23/2022 2317 by Cem Collier RN  Outcome: Ongoing     Problem: Falls - Risk of:  Goal: Absence of physical injury  Description: Absence of physical injury  3/23/2022 2317 by Cem Collier RN  Outcome: Ongoing     Problem: Skin Integrity:  Goal: Will show no infection signs and symptoms  Description: Will show no infection signs and symptoms  3/23/2022 2317 by Cem Collier RN  Outcome: Ongoing     Problem: Skin Integrity:  Goal: Absence of new skin breakdown  Description: Absence of new skin breakdown  3/23/2022 2317 by Cem Collier RN  Outcome: Ongoing